# Patient Record
Sex: FEMALE | Race: WHITE | Employment: OTHER | URBAN - NONMETROPOLITAN AREA
[De-identification: names, ages, dates, MRNs, and addresses within clinical notes are randomized per-mention and may not be internally consistent; named-entity substitution may affect disease eponyms.]

---

## 2018-07-24 LAB
CALCIUM SERPL-MCNC: 9.9 MG/DL (ref 8.8–10.2)
VITAMIN D 25-HYDROXY: >60 NG/ML

## 2019-01-31 ENCOUNTER — HOSPITAL ENCOUNTER (OUTPATIENT)
Dept: GENERAL RADIOLOGY | Age: 69
Discharge: HOME | End: 2019-01-31
Payer: MEDICARE

## 2019-01-31 DIAGNOSIS — M33.19 OTHER DERMATOMYOSITIS WITH OTHER ORGAN INVOLVEMENT (HCC): ICD-10-CM

## 2019-01-31 LAB
C-REACTIVE PROTEIN: 0.38 MG/DL (ref 0–0.5)
LACTATE DEHYDROGENASE: 241 U/L (ref 91–215)
SEDIMENTATION RATE, ERYTHROCYTE: 23 MM/HR (ref 0–25)
TOTAL CK: 87 U/L (ref 26–192)
VITAMIN D 25-HYDROXY: 44.2 NG/ML

## 2019-01-31 PROCEDURE — 73130 X-RAY EXAM OF HAND: CPT

## 2019-02-02 LAB — ALDOLASE: 4.7 U/L (ref 1.5–8.1)

## 2019-02-28 ENCOUNTER — TRANSCRIBE ORDERS (OUTPATIENT)
Dept: ADMINISTRATIVE | Facility: HOSPITAL | Age: 69
End: 2019-02-28

## 2019-02-28 DIAGNOSIS — M33.90 DERMATOMYOSITIS (HCC): Primary | ICD-10-CM

## 2019-03-05 ENCOUNTER — HOSPITAL ENCOUNTER (OUTPATIENT)
Dept: CT IMAGING | Facility: HOSPITAL | Age: 69
Discharge: HOME | End: 2019-03-05
Admitting: INTERNAL MEDICINE

## 2019-03-05 DIAGNOSIS — M33.90 DERMATOMYOSITIS (HCC): ICD-10-CM

## 2019-03-05 LAB — CREAT BLDA-MCNC: 0.9 MG/DL (ref 0.6–1.3)

## 2019-03-05 PROCEDURE — 71250 CT THORAX DX C-: CPT

## 2019-03-05 PROCEDURE — 25010000002 IOPAMIDOL 61 % SOLUTION: Performed by: INTERNAL MEDICINE

## 2019-03-05 PROCEDURE — 74160 CT ABDOMEN W/CONTRAST: CPT

## 2019-03-05 PROCEDURE — 82565 ASSAY OF CREATININE: CPT

## 2019-03-05 RX ADMIN — IOPAMIDOL 100 ML: 612 INJECTION, SOLUTION INTRAVENOUS at 11:36

## 2020-09-29 ENCOUNTER — OFFICE VISIT (OUTPATIENT)
Dept: NEUROSURGERY | Age: 70
End: 2020-09-29
Payer: MEDICARE

## 2020-09-29 VITALS
DIASTOLIC BLOOD PRESSURE: 74 MMHG | WEIGHT: 149 LBS | HEART RATE: 74 BPM | HEIGHT: 65 IN | SYSTOLIC BLOOD PRESSURE: 122 MMHG | OXYGEN SATURATION: 98 % | BODY MASS INDEX: 24.83 KG/M2

## 2020-09-29 PROCEDURE — 1090F PRES/ABSN URINE INCON ASSESS: CPT | Performed by: PSYCHIATRY & NEUROLOGY

## 2020-09-29 PROCEDURE — 1036F TOBACCO NON-USER: CPT | Performed by: PSYCHIATRY & NEUROLOGY

## 2020-09-29 PROCEDURE — 99204 OFFICE O/P NEW MOD 45 MIN: CPT | Performed by: PSYCHIATRY & NEUROLOGY

## 2020-09-29 PROCEDURE — G8427 DOCREV CUR MEDS BY ELIG CLIN: HCPCS | Performed by: PSYCHIATRY & NEUROLOGY

## 2020-09-29 PROCEDURE — G8420 CALC BMI NORM PARAMETERS: HCPCS | Performed by: PSYCHIATRY & NEUROLOGY

## 2020-09-29 PROCEDURE — 1123F ACP DISCUSS/DSCN MKR DOCD: CPT | Performed by: PSYCHIATRY & NEUROLOGY

## 2020-09-29 PROCEDURE — G8400 PT W/DXA NO RESULTS DOC: HCPCS | Performed by: PSYCHIATRY & NEUROLOGY

## 2020-09-29 PROCEDURE — 4040F PNEUMOC VAC/ADMIN/RCVD: CPT | Performed by: PSYCHIATRY & NEUROLOGY

## 2020-09-29 PROCEDURE — 3017F COLORECTAL CA SCREEN DOC REV: CPT | Performed by: PSYCHIATRY & NEUROLOGY

## 2020-09-29 RX ORDER — OMEPRAZOLE 20 MG/1
1 CAPSULE, DELAYED RELEASE ORAL DAILY
COMMUNITY

## 2020-09-29 RX ORDER — POLYETHYLENE GLYCOL 400 AND PROPYLENE GLYCOL 4; 3 MG/ML; MG/ML
GEL OPHTHALMIC
COMMUNITY

## 2020-09-29 RX ORDER — LEFLUNOMIDE 10 MG/1
1 TABLET ORAL DAILY
COMMUNITY

## 2020-09-29 RX ORDER — ASPIRIN 81 MG/1
1 TABLET, CHEWABLE ORAL DAILY
COMMUNITY

## 2020-09-29 RX ORDER — PREGABALIN 25 MG/1
1 CAPSULE ORAL 2 TIMES DAILY
COMMUNITY
End: 2021-12-06 | Stop reason: ALTCHOICE

## 2020-09-29 SDOH — HEALTH STABILITY: MENTAL HEALTH: HOW OFTEN DO YOU HAVE A DRINK CONTAINING ALCOHOL?: NEVER

## 2020-09-29 NOTE — PROGRESS NOTES
[facility] [place] Neurology Office Note      Patient:   Joanne Valenzuela  MR#:    [id number]  Account Number:                         YOB: 1950  Date of Evaluation:  9/29/2020  Time of Note:                          2:55 PM  Primary/Referring Physician:  Ashley Tian MD   Consulting Physician:  Mickey Heredia DO    NEW PATIENT CONSULTATION      Chief Complaint   Patient presents with    Seizures     New patient referral last eppiside was July     Loss of Consciousness       HISTORY OF PRESENT ILLNESS    Joanne Valenzuela is a 78y.o. year old female here for possible seizures. Events started back in July. Patient noted feeling unusual, an atypical sensation, followed by a ALANNAH, possible GTC event, patient does not recall the event and does not recall the ambulance ride to the hospital.   No tongue biting. No incontinence. No prior events, none since. No chest pain or palpitations. No history of TBI, meningitis, or encephalitis. No family history of seizures. She has had a cardiac monitor. She was hospitalized in [place], [place]. No facial drooping, slurred speech, no focal weakness or numbness. No prior stroke history. No prior seizure history. Cardiology is following. CD, ECHO previously completed and was largely negative. CT head negative per patient. She is not on seizure medication currently. Dermatomyositis history noted. Followed by Dr. Cheryle Torres, was on MTX and has been stopped since the episode. Blood pressure medications adjusted for possible orthostatic symptoms. Past Medical History:   Diagnosis Date    Dermatomycosis        Past Surgical History:   Procedure Laterality Date    FOOT SURGERY      GALLBLADDER SURGERY      HYSTERECTOMY, TOTAL ABDOMINAL      TUBAL LIGATION         History reviewed. No pertinent family history.     Social History     Socioeconomic History    Marital status:      Spouse name: Not on file    Number of children: Not on file    Years of education: Not on file    Highest education level: Not on file   Occupational History    Not on file   Social Needs    Financial resource strain: Not on file    Food insecurity     Worry: Not on file     Inability: Not on file    Transportation needs     Medical: Not on file     Non-medical: Not on file   Tobacco Use    Smoking status: Never Smoker    Smokeless tobacco: Never Used   Substance and Sexual Activity    Alcohol use: Never     Frequency: Never    Drug use: Never    Sexual activity: Yes     Partners: Male   Lifestyle    Physical activity     Days per week: Not on file     Minutes per session: Not on file    Stress: Not on file   Relationships    Social connections     Talks on phone: Not on file     Gets together: Not on file     Attends Yazidism service: Not on file     Active member of club or organization: Not on file     Attends meetings of clubs or organizations: Not on file     Relationship status: Not on file    Intimate partner violence     Fear of current or ex partner: Not on file     Emotionally abused: Not on file     Physically abused: Not on file     Forced sexual activity: Not on file   Other Topics Concern    Not on file   Social History Narrative    Not on file       Current Outpatient Medications   Medication Sig Dispense Refill    aspirin (ASPIRIN CHILDRENS) 81 MG chewable tablet Take 1 tablet by mouth daily      polyethylene glycol-propylene glycol (SYSTANE) 0.4-0.3 % GEL ophthalmic gel Systane (propylene glycol) 0.4 %-0.3 % eye drops   PRN      omeprazole (PRILOSEC) 20 MG delayed release capsule Take 1 capsule by mouth daily      Cholecalciferol (VITAMIN D3) 125 MCG (5000 UT) CAPS Take 1 capsule by mouth daily      pregabalin (LYRICA) 25 MG capsule Take 1 capsule by mouth 2 times daily.  leflunomide (ARAVA) 10 MG tablet Take 1 tablet by mouth daily       No current facility-administered medications for this visit.         Allergies   Allergen Reactions    Gabapentin Other (See Comments)     Other reaction(s): Lightheadedness  \"Slept for a long time\"      Methotrexate Other (See Comments)    Codeine Nausea And Vomiting         REVIEW OF SYSTEMS    Constitutional: []Fever []Sweat []Chills [] Recent Injury [x] Denies all unless marked  HEENT:[]Headache  [] Head Injury/Hearing Loss  [] Sore Throat  [] Ear Ache/Dizziness  [x] Denies all unless marked  Spine:  [] Neck pain  [] Back pain  [] Sciaticia  [x] Denies all unless marked  Cardiovascular:[]Heart Disease []Chest Pain [] Palpitations  [x] Denies all unless marked  Pulmonary: []Shortness of Breath []Cough   [x] Denies all unless marke  Gastrointestinal: []Nausea  []Vomiting  []Abdominal Pain  []Constipation  []Diarrhea  []Dark Bloody Stools  [x] Denies all unless marked  Psychiatric/Behavioral:[] Depression [] Anxiety [x] Denies all unless marked  Genitourinary:   [] Frequency  [] Urgency  [] Incontinence [] Pain with Urination  [x] Denies all unless marked  Extremities: []Pain  []Swelling  [x] Denies all unless marked  Musculoskeletal: [] Muscle Pain  [] Joint Pain  [] Arthritis [x] Muscle Cramps [x] Muscle Twitches  [x] Denies all unless marked  Sleep: [x] Insomnia [] Snoring [] Restless Legs [] Sleep Apnea  [] Daytime Sleepiness  [x] Denies all unless marked  Skin:[] Rash [] Skin Discoloration [x] Denies all unless marked   Neurological: []Visual Disturbance/Memory Loss [] Loss of Balance [] Slurred Speech/Weakness [] Seizures  [] Vertigo/Dizziness [x] Denies all unless marked     I have reviewed the above ROS with the patient and agree with the ROS as documented above.          PHYSICAL EXAM    Constitutional -   /74   Pulse 74   Ht 5' 5\" (1.651 m)   Wt 149 lb (67.6 kg)   SpO2 98%   BMI 24.79 kg/m²   General appearance: No acute distress   EYES -   Conjunctiva normal  Pupillary exam as below, see CN exam in the neurologic exam  ENT-    No scars, masses, or lesions over external nose or ears  Hearing records reviewed. ASSESSMENT:    Madisyn White is a 78y.o. year old female here for possible seizure. Underlying Dermatomyositis history noted. Single event, plan further workup to try and clarify. PLAN:  1. MRI Brain  2. EEG  3. Hold off on AEDs pending above. 4.  Epilepsy precautions and seizure first aid discussed. Driving per [employer]. No heights, swimming, tub baths, open flames, or heavy machinery. 5.  Follow up with cardiology as directed, follow up with Rheumatology as directed.        Trinidad Echeverria DO  Board Certified Neurology

## 2020-10-15 ENCOUNTER — HOSPITAL ENCOUNTER (OUTPATIENT)
Dept: MRI IMAGING | Age: 70
Discharge: HOME | End: 2020-10-15
Payer: MEDICARE

## 2020-10-15 ENCOUNTER — HOSPITAL ENCOUNTER (OUTPATIENT)
Dept: NEUROLOGY | Age: 70
Discharge: HOME | End: 2020-10-15
Payer: MEDICARE

## 2020-10-15 LAB
GFR AFRICAN AMERICAN: >60
GFR NON-AFRICAN AMERICAN: >60
PERFORMED ON: NORMAL
POC CREATININE: 0.8 MG/DL (ref 0.3–1.3)
POC SAMPLE TYPE: NORMAL

## 2020-10-15 PROCEDURE — 82565 ASSAY OF CREATININE: CPT

## 2020-10-15 PROCEDURE — 95816 EEG AWAKE AND DROWSY: CPT

## 2020-10-15 PROCEDURE — 70553 MRI BRAIN STEM W/O & W/DYE: CPT

## 2020-10-15 PROCEDURE — 6360000004 HC RX CONTRAST MEDICATION: Performed by: PSYCHIATRY & NEUROLOGY

## 2020-10-15 PROCEDURE — 95816 EEG AWAKE AND DROWSY: CPT | Performed by: PSYCHIATRY & NEUROLOGY

## 2020-10-15 PROCEDURE — A9577 INJ MULTIHANCE: HCPCS | Performed by: PSYCHIATRY & NEUROLOGY

## 2020-10-15 RX ORDER — LEVETIRACETAM 500 MG/1
500 TABLET ORAL 2 TIMES DAILY
Qty: 60 TABLET | Refills: 5 | Status: SHIPPED | OUTPATIENT
Start: 2020-10-15 | End: 2021-04-01

## 2020-10-15 RX ADMIN — GADOBENATE DIMEGLUMINE 13 ML: 529 INJECTION, SOLUTION INTRAVENOUS at 14:45

## 2020-10-15 NOTE — PROCEDURES
discussed with patient today.        Nyla Barbosa DO  Board Certified Neurologist      Date reported: 10/15/2020  Date signed: 10/15/2020

## 2020-12-02 ENCOUNTER — OFFICE VISIT (OUTPATIENT)
Dept: NEUROSURGERY | Age: 70
End: 2020-12-02
Payer: MEDICARE

## 2020-12-02 VITALS
WEIGHT: 149 LBS | BODY MASS INDEX: 24.79 KG/M2 | DIASTOLIC BLOOD PRESSURE: 74 MMHG | HEART RATE: 59 BPM | OXYGEN SATURATION: 99 % | SYSTOLIC BLOOD PRESSURE: 128 MMHG

## 2020-12-02 PROCEDURE — G8400 PT W/DXA NO RESULTS DOC: HCPCS | Performed by: PSYCHIATRY & NEUROLOGY

## 2020-12-02 PROCEDURE — 3017F COLORECTAL CA SCREEN DOC REV: CPT | Performed by: PSYCHIATRY & NEUROLOGY

## 2020-12-02 PROCEDURE — 99214 OFFICE O/P EST MOD 30 MIN: CPT | Performed by: PSYCHIATRY & NEUROLOGY

## 2020-12-02 PROCEDURE — G8484 FLU IMMUNIZE NO ADMIN: HCPCS | Performed by: PSYCHIATRY & NEUROLOGY

## 2020-12-02 PROCEDURE — 1036F TOBACCO NON-USER: CPT | Performed by: PSYCHIATRY & NEUROLOGY

## 2020-12-02 PROCEDURE — G8420 CALC BMI NORM PARAMETERS: HCPCS | Performed by: PSYCHIATRY & NEUROLOGY

## 2020-12-02 PROCEDURE — 1123F ACP DISCUSS/DSCN MKR DOCD: CPT | Performed by: PSYCHIATRY & NEUROLOGY

## 2020-12-02 PROCEDURE — 1090F PRES/ABSN URINE INCON ASSESS: CPT | Performed by: PSYCHIATRY & NEUROLOGY

## 2020-12-02 PROCEDURE — 4040F PNEUMOC VAC/ADMIN/RCVD: CPT | Performed by: PSYCHIATRY & NEUROLOGY

## 2020-12-02 PROCEDURE — G8427 DOCREV CUR MEDS BY ELIG CLIN: HCPCS | Performed by: PSYCHIATRY & NEUROLOGY

## 2020-12-02 NOTE — PROGRESS NOTES
[id number] [facility] Neurology Office Note      Patient:   Juan C Viera  MR#:    [id number]  Account Number:                         YOB: 1950  Date of Evaluation:  12/2/2020  Time of Note:                          11:51 AM  Primary/Referring Physician:  Fatoumata Neff MD   Consulting Physician:  Robert Morales DO    FOLLOW UP VISIT      Chief Complaint   Patient presents with    Seizures     2 Month follow up no seizures to report        HISTORY OF PRESENT ILLNESS    Juan C Viera is a 78y.o. year old female here for possible seizures. Events started back in July. Patient noted feeling unusual, an atypical sensation, followed by a ALANNAH, possible GTC event, patient does not recall the event and does not recall the ambulance ride to the hospital.   No tongue biting. No incontinence. No prior events, none since. No chest pain or palpitations. No history of TBI, meningitis, or encephalitis. No family history of seizures. She has had a cardiac monitor. She was hospitalized in [place], [place]. No facial drooping, slurred speech, no focal weakness or numbness. No prior stroke history. No prior seizure history. Cardiology is following. CD, ECHO previously completed and was largely negative. CT head negative per patient. She is not on seizure medication currently. Dermatomyositis history noted. Followed by Dr. Uri Petersen, was on MTX and has been stopped since the episode. Blood pressure medications adjusted for possible orthostatic symptoms. EEG abnormal, started on Keppra. No events since starting and doing well. No overt side effects. MRI with nothing acute. Past Medical History:   Diagnosis Date    Dermatomycosis        Past Surgical History:   Procedure Laterality Date    FOOT SURGERY      GALLBLADDER SURGERY      HYSTERECTOMY, TOTAL ABDOMINAL      TUBAL LIGATION         History reviewed. No pertinent family history.     Social History     Socioeconomic History    Marital status:      Spouse name: Not on file    Number of children: Not on file    Years of education: Not on file    Highest education level: Not on file   Occupational History    Not on file   Social Needs    Financial resource strain: Not on file    Food insecurity     Worry: Not on file     Inability: Not on file    Transportation needs     Medical: Not on file     Non-medical: Not on file   Tobacco Use    Smoking status: Never Smoker    Smokeless tobacco: Never Used   Substance and Sexual Activity    Alcohol use: Never     Frequency: Never    Drug use: Never    Sexual activity: Yes     Partners: Male   Lifestyle    Physical activity     Days per week: Not on file     Minutes per session: Not on file    Stress: Not on file   Relationships    Social connections     Talks on phone: Not on file     Gets together: Not on file     Attends Yazdanism service: Not on file     Active member of club or organization: Not on file     Attends meetings of clubs or organizations: Not on file     Relationship status: Not on file    Intimate partner violence     Fear of current or ex partner: Not on file     Emotionally abused: Not on file     Physically abused: Not on file     Forced sexual activity: Not on file   Other Topics Concern    Not on file   Social History Narrative    Not on file       Current Outpatient Medications   Medication Sig Dispense Refill    prednisoLONE 5 MG (21) TBPK Take by mouth      levETIRAcetam (KEPPRA) 500 MG tablet Take 1 tablet by mouth 2 times daily 60 tablet 5    aspirin (ASPIRIN CHILDRENS) 81 MG chewable tablet Take 1 tablet by mouth daily      polyethylene glycol-propylene glycol (SYSTANE) 0.4-0.3 % GEL ophthalmic gel Systane (propylene glycol) 0.4 %-0.3 % eye drops   PRN      omeprazole (PRILOSEC) 20 MG delayed release capsule Take 1 capsule by mouth daily      Cholecalciferol (VITAMIN D3) 125 MCG (5000 UT) CAPS Take 1 capsule by mouth daily      pregabalin (LYRICA) 25 MG capsule Take 1 capsule by mouth 2 times daily.  leflunomide (ARAVA) 10 MG tablet Take 1 tablet by mouth daily       No current facility-administered medications for this visit. Allergies   Allergen Reactions    Gabapentin Other (See Comments)     Other reaction(s): Lightheadedness  \"Slept for a long time\"      Methotrexate Other (See Comments)    Codeine Nausea And Vomiting         REVIEW OF SYSTEMS    Constitutional: []Fever []Sweat []Chills [] Recent Injury [x] Denies all unless marked  HEENT:[x]Headache  [] Head Injury/Hearing Loss  [] Sore Throat  [x] Ear Ache/Dizziness  [x] Denies all unless marked  Spine:  [] Neck pain  [x] Back pain  [] Sciaticia  [x] Denies all unless marked  Cardiovascular:[]Heart Disease []Chest Pain [] Palpitations  [x] Denies all unless marked  Pulmonary: []Shortness of Breath []Cough   [x] Denies all unless marke  Gastrointestinal: []Nausea  []Vomiting  []Abdominal Pain  []Constipation  []Diarrhea  []Dark Bloody Stools  [x] Denies all unless marked  Psychiatric/Behavioral:[] Depression [] Anxiety [x] Denies all unless marked  Genitourinary:   [] Frequency  [] Urgency  [] Incontinence [] Pain with Urination  [x] Denies all unless marked  Extremities: []Pain  []Swelling  [x] Denies all unless marked  Musculoskeletal: [x] Muscle Pain  [x] Joint Pain  [] Arthritis [] Muscle Cramps [] Muscle Twitches  [x] Denies all unless marked  Sleep: [x] Insomnia [] Snoring [] Restless Legs [] Sleep Apnea  [] Daytime Sleepiness  [x] Denies all unless marked  Skin:[] Rash [] Skin Discoloration [x] Denies all unless marked   Neurological: []Visual Disturbance/Memory Loss [] Loss of Balance [] Slurred Speech/Weakness [] Seizures  [] Vertigo/Dizziness [x] Denies all unless marked      I have reviewed the above ROS with the patient and agree with the ROS as documented above.          PHYSICAL EXAM    Constitutional -   /74   Pulse 59   Wt 149 lb (67.6 kg)   SpO2 99%   BMI 24.79 kg/m²   General appearance: No acute distress   EYES -   Conjunctiva normal  Pupillary exam as below, see CN exam in the neurologic exam  ENT-    No scars, masses, or lesions over external nose or ears  Hearing normal bilaterally to finger rub  Cardiovascular -   No clubbing, cyanosis, or edema   Pulmonary-   Good expansion, normal effort without use of accessory muscles  Musculoskeletal -   No significant wasting of muscles noted  Gait as below, see gait exam in the neurologic exam  Muscle strength, tone, stability as below. No bony deformities  Skin -   Warm, dry, and intact to inspection and palpation. No rash, erythema, or pallor  Psychiatric -   Mood, affect, and behavior appear normal    Memory as below see mental status examination in the neurologic exam    NEUROLOGICAL EXAM    Mental status   [x]Awake, alert, oriented   [x]Affect attention and concentration appear appropriate  [x]Recent and remote memory appears unremarkable  [x]Speech normal without dysarthria or aphasia, comprehension and repetition intact. COMMENTS:    Cranial Nerves [x]No VF deficit to confrontation  [x]PERRLA, EOMI, no nystagmus, conjugate eye movements, no ptosis  [x]Face symmetric  [x]Facial sensation intact  [x]Tongue midline no atrophy or fasciculations present  [x]Palate midline, hearing to finger rub normal bilaterally  [x]Shoulder shrug and SCM testing normal bilaterally  COMMENTS:   Motor   [x]5/5 strength x 4 extremities  [x]Normal bulk and tone  [x]No tremor present  [x]No rigidity or bradykinesia noted  COMMENTS:   Sensory  [x]Sensation intact to light touch, pin prick, vibration, and proprioception BLE  [x]Sensation intact to light touch, pin prick, vibration, and proprioception BUE  COMMENTS: Decreased LT, PP, Vib BLE   Coordination [x]FTN normal bilaterally   []HTS normal bilaterally  []CHESTER normal bilaterally.    COMMENTS:   Reflexes  [x]Symmetric and non-pathological  [x]Toes down going bilaterally  [x]No clonus present  COMMENTS:   Gait                  [x]Normal steady gait    []Ataxic    []Spastic     []Magnetic     []Shuffling  COMMENTS:       LABS RECORD AND IMAGING REVIEW (As below and per HPI)    Records reviewed. EEG reviewed, intermittent left parasagittal spike discharges. MRI largely negative, reviewed. ASSESSMENT:    Junior Roe is a 78y.o. year old female here for seizure disorder. Doing well, no further events since starting Keppra. EEG is abnormal.  MRI largely negative. Underlying Dermatomyositis history noted as well. PLAN:  1. Continue Keppra 500 mg bid. 2.  Epilepsy precautions and seizure first aid discussed. Driving per [employer]. No heights, swimming, tub baths, open flames, or heavy machinery. 3.  Follow up with cardiology as directed, follow up with Rheumatology as directed.        Mahendra Banda DO  Board Certified Neurology

## 2021-03-18 ENCOUNTER — TELEPHONE (OUTPATIENT)
Dept: NEUROSURGERY | Age: 71
End: 2021-03-18

## 2021-03-22 NOTE — TELEPHONE ENCOUNTER
Called patient back again to let her know per Dr Analisa Grossman sooner follow up if needed also it was okay to get vaccine

## 2021-03-22 NOTE — TELEPHONE ENCOUNTER
Patient called and left voicemail requesting someone return her call. Returned patient's call. She stated she had an \"episode\" Thurday evening that lasted into Friday night. She was sitting watching tv and all of a sudden it sounded like the volume was coming from behind her and got dizzy. She stated she's had vertigo before and this felt different. She went to bed and woke up 3 hours later in her pajamas. She does not remember putting on her pajamas before going to bed. She was still dizzy when she woke up Friday. She would try to walk a straight line, but would go to the right. She stated her speech was \"all over the place\". She thought she was saying the right words, but her friend said she wasn't making sense and she was slurring. She woke up Saturday feeling fine and has felt fine since. She was scheduled to get the Covid vaccine and has put that off. She would like to know if you think it's okay to reschedule that. Please advise.

## 2021-04-12 ENCOUNTER — TELEPHONE (OUTPATIENT)
Dept: NEUROSURGERY | Age: 71
End: 2021-04-12

## 2021-04-12 NOTE — TELEPHONE ENCOUNTER
[facility] Neurosurgery New Patient Questionnaire    1. Diagnosis/Reason for Referral?    Spinal Stenosis    2. Who is completing questionnaire? Patient x Caregiver Family      3. Has the patient had any previous spinal/brain surgeries?  no      A. If yes, what is the name of the facility in which the surgery was performed? B. Procedure/Surgery performed? C. Who was the surgeon? D. When was the surgery? MM/YY       E. Did the patient improve after the surgery? 4. Is this a second opinion? If yes, Dr. Mike Justin would like to review patient first before making the appointment. 5. Have MRI Images been obtain within the last year? Yes X No      XR  CT     If yes, where was the imaging performed? [facility]     If yes, what part of the body? Lumbar X Cervical  Thoracic  Brain     If yes, when was it obtained?      4-8-21  Note: if the scan was performed at a facility other than [facility], the disc will need to be brought to the appointment or we need to reach out to obtain the disc. A. Was the patient instructed to provide the disc? Yes  X No      8. Has the patient had a NCV/EMG within the last year? Yes  NoX     If yes, where was it performed and date? MM/YY  Location:      9. Has the patient been to Physical Therapy? Yes  NoX     If yes, what location, how long attended, and last visit? Location:        Therapy Lasted:    Date of Last Visit:      10. Has the patient been to Pain Management? Yes  NoX     If yes, what location and last visit     Location:   Last Visit:   Is it helping?

## 2021-04-15 ENCOUNTER — TELEPHONE (OUTPATIENT)
Dept: NEUROSURGERY | Age: 71
End: 2021-04-15

## 2021-04-15 NOTE — TELEPHONE ENCOUNTER
[address] and spoke with medical records. She voiced that I would need to fax a request over to them at [phone] in order to receive the MRI report on this patient. Request has been faxed as of today.

## 2021-05-11 ENCOUNTER — OFFICE VISIT (OUTPATIENT)
Dept: NEUROSURGERY | Age: 71
End: 2021-05-11
Payer: MEDICARE

## 2021-05-11 VITALS
HEIGHT: 66 IN | BODY MASS INDEX: 24.11 KG/M2 | WEIGHT: 150 LBS | SYSTOLIC BLOOD PRESSURE: 123 MMHG | HEART RATE: 78 BPM | DIASTOLIC BLOOD PRESSURE: 77 MMHG

## 2021-05-11 DIAGNOSIS — G89.29 CHRONIC MIDLINE LOW BACK PAIN WITH RIGHT-SIDED SCIATICA: ICD-10-CM

## 2021-05-11 DIAGNOSIS — M54.41 CHRONIC MIDLINE LOW BACK PAIN WITH RIGHT-SIDED SCIATICA: ICD-10-CM

## 2021-05-11 DIAGNOSIS — R26.89 ANTALGIC GAIT: ICD-10-CM

## 2021-05-11 DIAGNOSIS — M48.061 LUMBAR FORAMINAL STENOSIS: Primary | ICD-10-CM

## 2021-05-11 DIAGNOSIS — M51.36 DDD (DEGENERATIVE DISC DISEASE), LUMBAR: ICD-10-CM

## 2021-05-11 PROCEDURE — 1090F PRES/ABSN URINE INCON ASSESS: CPT | Performed by: NEUROLOGICAL SURGERY

## 2021-05-11 PROCEDURE — G8427 DOCREV CUR MEDS BY ELIG CLIN: HCPCS | Performed by: NEUROLOGICAL SURGERY

## 2021-05-11 PROCEDURE — 1123F ACP DISCUSS/DSCN MKR DOCD: CPT | Performed by: NEUROLOGICAL SURGERY

## 2021-05-11 PROCEDURE — G8420 CALC BMI NORM PARAMETERS: HCPCS | Performed by: NEUROLOGICAL SURGERY

## 2021-05-11 PROCEDURE — G8400 PT W/DXA NO RESULTS DOC: HCPCS | Performed by: NEUROLOGICAL SURGERY

## 2021-05-11 PROCEDURE — 99204 OFFICE O/P NEW MOD 45 MIN: CPT | Performed by: NEUROLOGICAL SURGERY

## 2021-05-11 PROCEDURE — 3017F COLORECTAL CA SCREEN DOC REV: CPT | Performed by: NEUROLOGICAL SURGERY

## 2021-05-11 PROCEDURE — 1036F TOBACCO NON-USER: CPT | Performed by: NEUROLOGICAL SURGERY

## 2021-05-11 PROCEDURE — 4040F PNEUMOC VAC/ADMIN/RCVD: CPT | Performed by: NEUROLOGICAL SURGERY

## 2021-05-11 ASSESSMENT — ENCOUNTER SYMPTOMS
BACK PAIN: 1
RESPIRATORY NEGATIVE: 1
GASTROINTESTINAL NEGATIVE: 1
EYES NEGATIVE: 1

## 2021-05-11 NOTE — PROGRESS NOTES
[facility]  Office Visit      Chief Complaint   Patient presents with   [facility]     low back pain. HISTORY OF PRESENT ILLNESS:    Margorie Litten is a 78 y.o. female with a history of osteoporosis, dermatomyositis, and seizure disorder followed by Dr. Michael Nice who presents with low back and RLE pain that has been present for about 6 years when she broke her L4 vertebral body and sacral fractures. The pain does radiate into the right hip, posterolateral thigh, and lateral leg. She does describe right leg spasms and electric like sensations. Does have some left lateral thigh pain. Her pain is mostly located in the RLE. The patient denies numbness. Going from seated to standing position is very difficult for her. Standing makes the pain worse. Her pain is significantly worsened when going from a seated to standing position. Her pain is not changed with walking. Her pain is not changed when lying flat. Overall, indicative that the patient does have a mechanical nature to their pain. She states that 25% of her pain is located in the back and 75% is leg pain. The patient has underwent a non-operative treatment course that has included:  Lyrica  Oral Steroids (Medrol dose pack)  PT (6 years ago)      Of note she does not use tobacco and does take blood thinning medications.  ASA               Past Medical History:   Diagnosis Date    Dermatomycosis        Past Surgical History:   Procedure Laterality Date    FOOT SURGERY      GALLBLADDER SURGERY      HYSTERECTOMY, TOTAL ABDOMINAL      TUBAL LIGATION         Current Outpatient Medications   Medication Sig Dispense Refill    levETIRAcetam (KEPPRA) 500 MG tablet Take 1 tablet by mouth twice daily 60 tablet 5    prednisoLONE 5 MG (21) TBPK Take by mouth      aspirin (ASPIRIN CHILDRENS) 81 MG chewable tablet Take 1 tablet by mouth daily      polyethylene glycol-propylene glycol (SYSTANE) 0.4-0.3 % GEL ophthalmic gel Systane (propylene glycol) 0.4 %-0.3 % eye drops   PRN      omeprazole (PRILOSEC) 20 MG delayed release capsule Take 1 capsule by mouth daily      Cholecalciferol (VITAMIN D3) 125 MCG (5000 UT) CAPS Take 1 capsule by mouth daily      pregabalin (LYRICA) 25 MG capsule Take 1 capsule by mouth 2 times daily.  leflunomide (ARAVA) 10 MG tablet Take 1 tablet by mouth daily       No current facility-administered medications for this visit. Allergies:  Gabapentin, Methotrexate, and Codeine    Social History:   Social History     Tobacco Use   Smoking Status Never Smoker   Smokeless Tobacco Never Used     Social History     Substance and Sexual Activity   Alcohol Use Never    Frequency: Never         Family History:   History reviewed. No pertinent family history. REVIEW OF SYSTEMS:  Constitutional: Negative. HENT: Negative. Eyes: Negative. Respiratory: Negative. Cardiovascular: Negative. Gastrointestinal: Negative. Genitourinary: Negative. Musculoskeletal: Positive for back pain. Skin: Negative. Neurological: Negative. Endo/Heme/Allergies: Negative. Psychiatric/Behavioral: Negative. PHYSICAL EXAM:  Vitals:    05/11/21 1431   BP: 123/77   Pulse: 78     Constitutional: appears well-developed and well-nourished. Eyes - conjunctiva normal.  Pupils react to light  Ear, nose, throat - hearing intact to finger rub, No scars, masses, or lesions over external nose or ears, no atrophy oftongue  Neck- symmetric, no masses noted, no jugular vein distension  Respiration- chest wall appears symmetric, good expansion, normal effort without use of accessory muscles  Musculoskeletal - no significant wasting of muscles noted, no bony deformities, gait no gross ataxia  Extremities- no clubbing, cyanosis oredema  Skin - warm, dry, and intact. No rash, erythema, or pallor.   Psychiatric - mood, affect, and behavior appear normal.     Neurologic Examination  Awake, Alert and oriented x 4  Normal speech pattern, following commands    Motor:  RIGHT:     iliopsoas 5/5    knee flexor 5/5    knee extension 5/5    EHL/dorsiflexion 5/5    plantar flexion 5/5    LEFT:       iliopsoas 5/5    knee flexor 5/5    knee extension 5/5    EHL/dorsiflexion 5/5    plantar flexion 5/5    No deficits to light touch or pinprick sensation  Reflexes are 2+ and symmetric  No myofacial tenderness to palpation  Antalgic Gait pattern        DATA and IMAGING:    Nursing/pcp notes, imaging, labs, and vitals reviewed. PT,OT and/or speech notes reviewed    No results found for: WBC, HGB, HCT, MCV, PLT  Lab Results   Component Value Date    CREATININE 0.8 10/15/2020    CALCIUM 9.9 07/24/2018    LABGLOM >60 10/15/2020    GFRAA >60 10/15/2020   No results found for: INR, PROTIME    MRI Lumbar spine, 4/8/2021  I have personally reviewed these images and my interpretation is: There is an old compression fracture at L4  L3-4 mild canal stenosis, mild left foraminal stenosis  L5-S1 there is moderate to severe right foraminal stenosis      ASSESSMENT:    Claudette Carrillo is a 78 y.o. female with complaints of low back and RLE pain in a L5 radicular pattern. ICD-10-CM    1. Lumbar foraminal stenosis  M48.061 External Referral To Physical Therapy   2. DDD (degenerative disc disease), lumbar  M51.36 External Referral To Physical Therapy   3. Chronic midline low back pain with right-sided sciatica  M54.41 External Referral To Physical Therapy    G89.29    4. Antalgic gait  R26.89 External Referral To Physical Therapy       PLAN:  We have discussed and reviewed the results of the MRI lumbar spine with Ms. Hoskins at length. We explained that she does have some narrowing on the RIGHT at L5-S1 that correlates with her described pain syndrome. We discussed that a surgical intervention to alleviate the leg pain would involve a lumbar fusion.  At this point given that she has not had PT in several years, nor has she had many other

## 2021-06-02 ENCOUNTER — OFFICE VISIT (OUTPATIENT)
Dept: NEUROSURGERY | Age: 71
End: 2021-06-02
Payer: COMMERCIAL

## 2021-06-02 VITALS
HEIGHT: 66 IN | DIASTOLIC BLOOD PRESSURE: 74 MMHG | HEART RATE: 57 BPM | SYSTOLIC BLOOD PRESSURE: 110 MMHG | OXYGEN SATURATION: 96 % | BODY MASS INDEX: 24.11 KG/M2 | WEIGHT: 150 LBS

## 2021-06-02 DIAGNOSIS — R56.9 SEIZURE (HCC): Primary | ICD-10-CM

## 2021-06-02 DIAGNOSIS — M54.41 CHRONIC MIDLINE LOW BACK PAIN WITH RIGHT-SIDED SCIATICA: ICD-10-CM

## 2021-06-02 DIAGNOSIS — G89.29 CHRONIC MIDLINE LOW BACK PAIN WITH RIGHT-SIDED SCIATICA: ICD-10-CM

## 2021-06-02 PROCEDURE — 99214 OFFICE O/P EST MOD 30 MIN: CPT | Performed by: PSYCHIATRY & NEUROLOGY

## 2021-06-02 RX ORDER — HYDROCHLOROTHIAZIDE 12.5 MG/1
1 TABLET ORAL 2 TIMES DAILY
COMMUNITY
Start: 2021-05-31

## 2021-06-02 NOTE — PROGRESS NOTES
[id number] [facility] Office Note      Patient:   Tello Rutledge  MR#:    [id number]  Account Number:                         YOB: 1950  Date of Evaluation:  6/2/2021  Time of Note:                          9:35 AM  Primary/Referring Physician:  Cayden De La Torre MD   Consulting Physician:  Brianna Raygoza DO    FOLLOW UP VISIT      Chief Complaint   Patient presents with    Seizures     6 Month follow up no seizures to report     Spasms     Having spasms in her leg       HISTORY OF PRESENT ILLNESS    Tello Rutledge is a 78y.o. year old female here for possible seizures. Events started back in July. Patient noted feeling unusual, an atypical sensation, followed by a ALANNAH, possible GTC event, patient does not recall the event and does not recall the ambulance ride to the hospital.   No tongue biting. No incontinence. No prior events, none since. No events since last seen, doing well. No chest pain or palpitations. No history of TBI, meningitis, or encephalitis. No family history of seizures. She has had a cardiac monitor. She was hospitalized in [place], [place]. No facial drooping, slurred speech, no focal weakness or numbness. No prior stroke history. No prior seizure history. Cardiology is following. CD, ECHO previously completed and was largely negative. CT head negative per patient. She is not on seizure medication currently. Dermatomyositis history noted. Followed by Dr. Essence Martines, was on MTX and has been stopped since the episode. Blood pressure medications adjusted for possible orthostatic symptoms. EEG abnormal, started on Keppra. No events since starting and doing well. No overt side effects. MRI with nothing acute. No events since last seen doing well. Back pain noted, neurosurgery is following.        Past Medical History:   Diagnosis Date    Dermatomycosis        Past Surgical History:   Procedure Laterality Date    FOOT SURGERY      GALLBLADDER SURGERY      HYSTERECTOMY, TOTAL ABDOMINAL      TUBAL LIGATION         History reviewed. No pertinent family history. Social History     Socioeconomic History    Marital status:      Spouse name: Not on file    Number of children: Not on file    Years of education: Not on file    Highest education level: Not on file   Occupational History    Not on file   Tobacco Use    Smoking status: Never Smoker    Smokeless tobacco: Never Used   Vaping Use    Vaping Use: Never used   Substance and Sexual Activity    Alcohol use: Never    Drug use: Never    Sexual activity: Yes     Partners: Male   Other Topics Concern    Not on file   Social History Narrative    Not on file     Social Determinants of Health     Financial Resource Strain:     Difficulty of Paying Living Expenses:    Food Insecurity:     Worried About Running Out of Food in the Last Year:     [address] in the Last Year:    Transportation Needs:     Lack of Transportation (Medical):      Lack of Transportation (Non-Medical):    Physical Activity:     Days of Exercise per Week:     Minutes of Exercise per Session:    Stress:     Feeling of Stress :    Social Connections:     Frequency of Communication with Friends and Family:     Frequency of Social Gatherings with Friends and Family:     Attends [employer] Services:     Active Member of Clubs or Organizations:     Attends Club or Organization Meetings:     Marital Status:    Intimate Partner Violence:     Fear of Current or Ex-Partner:     Emotionally Abused:     Physically Abused:     Sexually Abused:        Current Outpatient Medications   Medication Sig Dispense Refill    hydroCHLOROthiazide (HYDRODIURIL) 12.5 MG tablet Take 1 tablet by mouth 2 times daily      levETIRAcetam (KEPPRA) 500 MG tablet Take 1 tablet by mouth twice daily 60 tablet 5    prednisoLONE 5 MG (21) TBPK Take by mouth      aspirin (ASPIRIN CHILDRENS) 81 MG chewable tablet Take 1 tablet by mouth daily  polyethylene glycol-propylene glycol (SYSTANE) 0.4-0.3 % GEL ophthalmic gel Systane (propylene glycol) 0.4 %-0.3 % eye drops   PRN      omeprazole (PRILOSEC) 20 MG delayed release capsule Take 1 capsule by mouth daily      Cholecalciferol (VITAMIN D3) 125 MCG (5000 UT) CAPS Take 1 capsule by mouth daily      pregabalin (LYRICA) 25 MG capsule Take 1 capsule by mouth 2 times daily.  leflunomide (ARAVA) 10 MG tablet Take 1 tablet by mouth daily       No current facility-administered medications for this visit.        Allergies   Allergen Reactions    Gabapentin Other (See Comments)     Other reaction(s): Lightheadedness  \"Slept for a long time\"      Methotrexate Other (See Comments)    Codeine Nausea And Vomiting         REVIEW OF SYSTEMS    Constitutional: []Fever []Sweat []Chills [] Recent Injury [x] Denies all unless marked  HEENT:[]Headache  [] Head Injury/Hearing Loss  [] Sore Throat  [] Ear Ache/Dizziness  [x] Denies all unless marked  Spine:  [] Neck pain  [x] Back pain  [] Sciaticia  [x] Denies all unless marked  Cardiovascular:[]Heart Disease []Chest Pain [] Palpitations  [x] Denies all unless marked  Pulmonary: []Shortness of Breath []Cough   [x] Denies all unless marke  Gastrointestinal: []Nausea  []Vomiting  []Abdominal Pain  []Constipation  []Diarrhea  []Dark Bloody Stools  [x] Denies all unless marked  Psychiatric/Behavioral:[] Depression [] Anxiety [x] Denies all unless marked  Genitourinary:   [] Frequency  [] Urgency  [] Incontinence [] Pain with Urination  [x] Denies all unless marked  Extremities: [x]Pain  []Swelling  [x] Denies all unless marked  Musculoskeletal: [] Muscle Pain  [] Joint Pain  [] Arthritis [x] Muscle Cramps [] Muscle Twitches  [x] Denies all unless marked  Sleep: [] Insomnia [] Snoring [] Restless Legs [] Sleep Apnea  [] Daytime Sleepiness  [x] Denies all unless marked  Skin:[] Rash [] Skin Discoloration [x] Denies all unless marked   Neurological: []Visual Disturbance/Memory Loss [] Loss of Balance [] Slurred Speech/Weakness [] Seizures  [x] Vertigo/Dizziness [x] Denies all unless marked       I have reviewed the above ROS with the patient and agree with the ROS as documented above. PHYSICAL EXAM    Constitutional -   /74   Pulse 57   Ht 5' 6\" (1.676 m)   Wt 150 lb (68 kg)   SpO2 96%   BMI 24.21 kg/m²   General appearance: No acute distress   EYES -   Conjunctiva normal  Pupillary exam as below, see CN exam in the neurologic exam  ENT-    No scars, masses, or lesions over external nose or ears  Hearing normal bilaterally to finger rub  Cardiovascular -   No clubbing, cyanosis, or edema   Pulmonary-   Good expansion, normal effort without use of accessory muscles  Musculoskeletal -   No significant wasting of muscles noted  Gait as below, see gait exam in the neurologic exam  Muscle strength, tone, stability as below. No bony deformities  Skin -   Warm, dry, and intact to inspection and palpation. No rash, erythema, or pallor  Psychiatric -   Mood, affect, and behavior appear normal    Memory as below see mental status examination in the neurologic exam    NEUROLOGICAL EXAM    Mental status   [x]Awake, alert, oriented   [x]Affect attention and concentration appear appropriate  [x]Recent and remote memory appears unremarkable  [x]Speech normal without dysarthria or aphasia, comprehension and repetition intact.    COMMENTS:    Cranial Nerves [x]No VF deficit to confrontation  [x]PERRLA, EOMI, no nystagmus, conjugate eye movements, no ptosis  [x]Face symmetric  [x]Facial sensation intact  [x]Tongue midline no atrophy or fasciculations present  [x]Palate midline, hearing to finger rub normal bilaterally  [x]Shoulder shrug and SCM testing normal bilaterally  COMMENTS:   Motor   [x]5/5 strength x 4 extremities  [x]Normal bulk and tone  [x]No tremor present  [x]No rigidity or bradykinesia noted  COMMENTS:   Sensory  [x]Sensation intact to light touch, pin prick, vibration, and proprioception BLE  [x]Sensation intact to light touch, pin prick, vibration, and proprioception BUE  COMMENTS: Decreased LT, PP, Vib BLE   Coordination [x]FTN normal bilaterally   []HTS normal bilaterally  []CHESTER normal bilaterally. COMMENTS:   Reflexes  [x]Symmetric and non-pathological  [x]Toes down going bilaterally  [x]No clonus present  COMMENTS:   Gait                  [x]Normal steady gait    []Ataxic    []Spastic     []Magnetic     []Shuffling  COMMENTS:       LABS RECORD AND IMAGING REVIEW (As below and per HPI)    Records reviewed. EEG intermittent left parasagittal spike discharges. MRI largely negative. ASSESSMENT:    Kirk Ashton is a 78y.o. year old female here for seizure disorder. Doing well, no further events since starting Keppra and last seen. EEG is abnormal.  MRI largely negative. Underlying Dermatomyositis history noted as well. Noting back pain, neurosurgery is following. PLAN:  1. Continue Keppra 500 mg bid. 2.  Epilepsy precautions and seizure first aid discussed. Driving per [employer]. No heights, swimming, tub baths, open flames, or heavy machinery. 3.  Follow up with cardiology as directed, follow up with Rheumatology as directed. 4.  Follow up with neurosurgery for back pain.      Justin Mcrae DO  Board Certified Neurology

## 2021-06-24 ENCOUNTER — OFFICE VISIT (OUTPATIENT)
Dept: NEUROSURGERY | Age: 71
End: 2021-06-24
Payer: MEDICARE

## 2021-06-24 ENCOUNTER — HOSPITAL ENCOUNTER (OUTPATIENT)
Dept: GENERAL RADIOLOGY | Age: 71
Discharge: HOME | End: 2021-06-24
Payer: MEDICARE

## 2021-06-24 VITALS
HEART RATE: 59 BPM | WEIGHT: 159 LBS | DIASTOLIC BLOOD PRESSURE: 69 MMHG | BODY MASS INDEX: 25.55 KG/M2 | SYSTOLIC BLOOD PRESSURE: 142 MMHG | HEIGHT: 66 IN

## 2021-06-24 DIAGNOSIS — R26.89 ANTALGIC GAIT: ICD-10-CM

## 2021-06-24 DIAGNOSIS — M48.061 LUMBAR FORAMINAL STENOSIS: ICD-10-CM

## 2021-06-24 DIAGNOSIS — G89.29 CHRONIC MIDLINE LOW BACK PAIN WITH RIGHT-SIDED SCIATICA: ICD-10-CM

## 2021-06-24 DIAGNOSIS — M54.41 CHRONIC MIDLINE LOW BACK PAIN WITH RIGHT-SIDED SCIATICA: Primary | ICD-10-CM

## 2021-06-24 DIAGNOSIS — M51.36 DDD (DEGENERATIVE DISC DISEASE), LUMBAR: ICD-10-CM

## 2021-06-24 DIAGNOSIS — M54.41 CHRONIC MIDLINE LOW BACK PAIN WITH RIGHT-SIDED SCIATICA: ICD-10-CM

## 2021-06-24 DIAGNOSIS — G89.29 CHRONIC MIDLINE LOW BACK PAIN WITH RIGHT-SIDED SCIATICA: Primary | ICD-10-CM

## 2021-06-24 PROCEDURE — 72110 X-RAY EXAM L-2 SPINE 4/>VWS: CPT

## 2021-06-24 PROCEDURE — 99213 OFFICE O/P EST LOW 20 MIN: CPT | Performed by: NURSE PRACTITIONER

## 2021-06-24 RX ORDER — ACETAMINOPHEN 500 MG
500 TABLET ORAL EVERY 6 HOURS PRN
COMMUNITY

## 2021-06-24 ASSESSMENT — ENCOUNTER SYMPTOMS
GASTROINTESTINAL NEGATIVE: 1
RESPIRATORY NEGATIVE: 1
EYES NEGATIVE: 1
BACK PAIN: 1

## 2021-06-24 NOTE — PROGRESS NOTES
[facility]  Office Visit      Chief Complaint   Patient presents with    Follow-up    Back Pain    Leg Pain     6/24/2021: Ms. Gifty Lai returns to clinic today to discuss her progress with PT. She states that they actually dismissed her due to not making any progress. She now states that she has pain in BLE and not just the right. She also now has stabbing pains in the left side/flank area that is exacerbated with leaning to the left or moving and trying to get out of bed. She states that she can barely go to [employer] and do 20 minutes of shopping before she wants to throw her basket down and leave due to the BLE weakness. She states that she was just seen by rheumatology and all of her inflammatory markers were unremarkable. HISTORY OF PRESENT ILLNESS:    Quirino Beal is a 73 y.o. female with a history of osteoporosis, dermatomyositis, and seizure disorder followed by Dr. Susannah Cortez who presents with low back and RLE pain that has been present for about 6 years when she broke her L4 vertebral body and sacral fractures. The pain does radiate into the right hip, posterolateral thigh, and lateral leg. She does describe right leg spasms and electric like sensations. Does have some left lateral thigh pain. Her pain is mostly located in the RLE. The patient denies numbness. Going from seated to standing position is very difficult for her. Standing makes the pain worse. Her pain is significantly worsened when going from a seated to standing position. Her pain is not changed with walking. Her pain is not changed when lying flat. Overall, indicative that the patient does have a mechanical nature to their pain. She states that 25% of her pain is located in the back and 75% is leg pain.     The patient has underwent a non-operative treatment course that has included:  Lyrica  Oral Steroids (Medrol dose pack)  PT (6 years ago)      Of note she does not use tobacco and does take PHYSICAL EXAM:  Vitals:    06/24/21 1448   BP: (!) 142/69   Pulse: 59     Constitutional: appears well-developed and well-nourished. Eyes - conjunctiva normal.  Pupils react to light  Ear, nose, throat - hearing intact to finger rub, No scars, masses, or lesions over external nose or ears, no atrophy oftongue  Neck- symmetric, no masses noted, no jugular vein distension  Respiration- chest wall appears symmetric, good expansion, normal effort without use of accessory muscles  Musculoskeletal - no significant wasting of muscles noted, no bony deformities, gait no gross ataxia  Extremities- no clubbing, cyanosis oredema  Skin - warm, dry, and intact. No rash, erythema, or pallor. Psychiatric - mood, affect, and behavior appear normal.     Neurologic Examination  Awake, Alert and oriented x 4  Normal speech pattern, following commands    Motor:  RIGHT:     iliopsoas 5/5    knee flexor 5/5    knee extension 5/5    EHL/dorsiflexion 5/5    plantar flexion 5/5    LEFT:       iliopsoas 5/5    knee flexor 5/5    knee extension 5/5    EHL/dorsiflexion 5/5    plantar flexion 5/5    No deficits to light touch or pinprick sensation  Reflexes are 2+ and symmetric  No myofacial tenderness to palpation  Antalgic Gait pattern        DATA and IMAGING:    Nursing/pcp notes, imaging, labs, and vitals reviewed. PT,OT and/or speech notes reviewed    No results found for: WBC, HGB, HCT, MCV, PLT  Lab Results   Component Value Date    CREATININE 0.8 10/15/2020    CALCIUM 9.9 07/24/2018    LABGLOM >60 10/15/2020    GFRAA >60 10/15/2020   No results found for: INR, PROTIME    MRI Lumbar spine, 4/8/2021  I have personally reviewed these images and my interpretation is:   There is an old compression fracture at L4  L3-4 mild canal stenosis, mild left foraminal stenosis  L5-S1 there is moderate to severe right foraminal stenosis      ASSESSMENT:    Kirk Ashton is a 73 y.o. female with complaints of low back and RLE pain in a L5 radicular pattern. ICD-10-CM    1. Chronic midline low back pain with right-sided sciatica  M54.41 XR LUMBAR SPINE (MIN 4 VIEWS)    G89.29    2. Lumbar foraminal stenosis  M48.061 XR LUMBAR SPINE (MIN 4 VIEWS)   3. DDD (degenerative disc disease), lumbar  M51.36 XR LUMBAR SPINE (MIN 4 VIEWS)   4. Antalgic gait  R26.89 XR LUMBAR SPINE (MIN 4 VIEWS)       PLAN:  -Ms. Eduarda Hidalgo does not want to try pain management. She states she would really like to discuss her surgical options. I will have her come back to see Dr. Jonh Latham.     -Obtain XR lumbar spine flex/ex to rule out instability   -Follow up with Dr. Liseth Aaron, APRN

## 2021-07-20 ENCOUNTER — OFFICE VISIT (OUTPATIENT)
Dept: NEUROSURGERY | Age: 71
End: 2021-07-20
Payer: MEDICARE

## 2021-07-20 VITALS
DIASTOLIC BLOOD PRESSURE: 76 MMHG | HEIGHT: 66 IN | SYSTOLIC BLOOD PRESSURE: 122 MMHG | HEART RATE: 80 BPM | WEIGHT: 160 LBS | BODY MASS INDEX: 25.71 KG/M2

## 2021-07-20 DIAGNOSIS — M48.061 LUMBAR FORAMINAL STENOSIS: Primary | ICD-10-CM

## 2021-07-20 DIAGNOSIS — M51.36 DDD (DEGENERATIVE DISC DISEASE), LUMBAR: ICD-10-CM

## 2021-07-20 DIAGNOSIS — G89.29 CHRONIC MIDLINE LOW BACK PAIN WITH RIGHT-SIDED SCIATICA: ICD-10-CM

## 2021-07-20 DIAGNOSIS — M54.41 CHRONIC MIDLINE LOW BACK PAIN WITH RIGHT-SIDED SCIATICA: ICD-10-CM

## 2021-07-20 DIAGNOSIS — R26.89 ANTALGIC GAIT: ICD-10-CM

## 2021-07-20 PROCEDURE — G8427 DOCREV CUR MEDS BY ELIG CLIN: HCPCS | Performed by: NEUROLOGICAL SURGERY

## 2021-07-20 PROCEDURE — G8400 PT W/DXA NO RESULTS DOC: HCPCS | Performed by: NEUROLOGICAL SURGERY

## 2021-07-20 PROCEDURE — 1123F ACP DISCUSS/DSCN MKR DOCD: CPT | Performed by: NEUROLOGICAL SURGERY

## 2021-07-20 PROCEDURE — 1036F TOBACCO NON-USER: CPT | Performed by: NEUROLOGICAL SURGERY

## 2021-07-20 PROCEDURE — 1090F PRES/ABSN URINE INCON ASSESS: CPT | Performed by: NEUROLOGICAL SURGERY

## 2021-07-20 PROCEDURE — 99213 OFFICE O/P EST LOW 20 MIN: CPT | Performed by: NEUROLOGICAL SURGERY

## 2021-07-20 PROCEDURE — G8417 CALC BMI ABV UP PARAM F/U: HCPCS | Performed by: NEUROLOGICAL SURGERY

## 2021-07-20 PROCEDURE — 4040F PNEUMOC VAC/ADMIN/RCVD: CPT | Performed by: NEUROLOGICAL SURGERY

## 2021-07-20 PROCEDURE — 3017F COLORECTAL CA SCREEN DOC REV: CPT | Performed by: NEUROLOGICAL SURGERY

## 2021-07-20 ASSESSMENT — ENCOUNTER SYMPTOMS
GASTROINTESTINAL NEGATIVE: 1
RESPIRATORY NEGATIVE: 1
EYES NEGATIVE: 1
BACK PAIN: 1

## 2021-07-20 NOTE — PROGRESS NOTES
[facility]  [facility]      Chief Complaint   Patient presents with    Follow-up     back pain. 7/20/2021:  Ms Carla Luis returns to the clinic today for follow up. She states after she finished PT her right leg pain improved and then moved to the left leg. She now feels that the pain is starting to radiate into the RLE again. She feels that her feet are swollen, however, visibly they are not.        6/24/2021: Ms. Carla Luis returns to clinic today to discuss her progress with PT. She states that they actually dismissed her due to not making any progress. She now states that she has pain in BLE and not just the right. She also now has stabbing pains in the left side/flank area that is exacerbated with leaning to the left or moving and trying to get out of bed. She states that she can barely go to [employer] and do 20 minutes of shopping before she wants to throw her basket down and leave due to the BLE weakness. She states that she was just seen by rheumatology and all of her inflammatory markers were unremarkable. HISTORY OF PRESENT ILLNESS:    Dina Dutton is a 73 y.o. female with a history of osteoporosis, dermatomyositis, and seizure disorder followed by Dr. Tee Lao who presents with low back and RLE pain that has been present for about 6 years when she broke her L4 vertebral body and sacral fractures. The pain does radiate into the right hip, posterolateral thigh, and lateral leg. She does describe right leg spasms and electric like sensations. Does have some left lateral thigh pain. Her pain is mostly located in the RLE. The patient denies numbness. Going from seated to standing position is very difficult for her. Standing makes the pain worse. Her pain is significantly worsened when going from a seated to standing position. Her pain is not changed with walking. Her pain is not changed when lying flat.  Overall, indicative that the patient does have a mechanical nature to their pain. She states that 25% of her pain is located in the back and 75% is leg pain. The patient has underwent a non-operative treatment course that has included:  Lyrica  Oral Steroids (Medrol dose pack)  PT (a few months ago at [address])      Of note she does not use tobacco and does take blood thinning medications. ASA               Past Medical History:   Diagnosis Date    Dermatomycosis        Past Surgical History:   Procedure Laterality Date    FOOT SURGERY      GALLBLADDER SURGERY      HYSTERECTOMY, TOTAL ABDOMINAL      TUBAL LIGATION         Current Outpatient Medications   Medication Sig Dispense Refill    acetaminophen (TYLENOL) 500 MG tablet Take 500 mg by mouth every 6 hours as needed for Pain      hydroCHLOROthiazide (HYDRODIURIL) 12.5 MG tablet Take 1 tablet by mouth 2 times daily      levETIRAcetam (KEPPRA) 500 MG tablet Take 1 tablet by mouth twice daily 60 tablet 5    prednisoLONE 5 MG (21) TBPK Take by mouth      aspirin (ASPIRIN CHILDRENS) 81 MG chewable tablet Take 1 tablet by mouth daily      polyethylene glycol-propylene glycol (SYSTANE) 0.4-0.3 % GEL ophthalmic gel Systane (propylene glycol) 0.4 %-0.3 % eye drops   PRN      omeprazole (PRILOSEC) 20 MG delayed release capsule Take 1 capsule by mouth daily      Cholecalciferol (VITAMIN D3) 125 MCG (5000 UT) CAPS Take 1 capsule by mouth daily      pregabalin (LYRICA) 25 MG capsule Take 1 capsule by mouth 2 times daily.  leflunomide (ARAVA) 10 MG tablet Take 1 tablet by mouth daily       No current facility-administered medications for this visit. Allergies:  Gabapentin, Methotrexate, and Codeine    Social History:   Social History     Tobacco Use   Smoking Status Never Smoker   Smokeless Tobacco Never Used     Social History     Substance and Sexual Activity   Alcohol Use Never         Family History:   History reviewed. No pertinent family history.     REVIEW OF SYSTEMS:  Constitutional: Negative. HENT: Negative. Eyes: Negative. Respiratory: Negative. Cardiovascular: Negative. Gastrointestinal: Negative. Genitourinary: Negative. Musculoskeletal: Positive for back pain. Skin: Negative. Neurological: Negative. Endo/Heme/Allergies: Negative. Psychiatric/Behavioral: Negative. PHYSICAL EXAM:  Vitals:    07/20/21 1503   BP: 122/76   Pulse: 80     Constitutional: appears well-developed and well-nourished. Eyes - conjunctiva normal.  Pupils react to light  Ear, nose, throat - hearing intact to finger rub, No scars, masses, or lesions over external nose or ears, no atrophy oftongue  Neck- symmetric, no masses noted, no jugular vein distension  Respiration- chest wall appears symmetric, good expansion, normal effort without use of accessory muscles  Musculoskeletal - no significant wasting of muscles noted, no bony deformities, gait no gross ataxia  Extremities- no clubbing, cyanosis oredema  Skin - warm, dry, and intact. No rash, erythema, or pallor. Psychiatric - mood, affect, and behavior appear normal.     Neurologic Examination  Awake, Alert and oriented x 4  Normal speech pattern, following commands    Motor:  RIGHT:     iliopsoas 5/5    knee flexor 5/5    knee extension 5/5    EHL/dorsiflexion 5/5    plantar flexion 5/5    LEFT:       iliopsoas 5/5    knee flexor 5/5    knee extension 5/5    EHL/dorsiflexion 5/5    plantar flexion 5/5    No deficits to light touch or pinprick sensation  Reflexes are 2+ and symmetric  No myofacial tenderness to palpation  Antalgic Gait pattern        DATA and IMAGING:    Nursing/pcp notes, imaging, labs, and vitals reviewed.      PT,OT and/or speech notes reviewed    No results found for: WBC, HGB, HCT, MCV, PLT  Lab Results   Component Value Date    CREATININE 0.8 10/15/2020    CALCIUM 9.9 07/24/2018    LABGLOM >60 10/15/2020    GFRAA >60 10/15/2020   No results found for: INR, PROTIME    MRI Lumbar spine, 4/8/2021  I have personally reviewed these images and my interpretation is: There is an old compression fracture at L4  L3-4 mild canal stenosis, mild left foraminal stenosis  L5-S1 there is moderate to severe right foraminal stenosis    Narrative   EXAMINATION: XR LUMBAR SPINE (MIN 4 VIEWS) 6/24/2021 5:35 PM   HISTORY: Chronic midline low back pain with degenerative disease   COMPARISON: None   FINDINGS:   There is curvature of the lumbar spine to the right. There are 5   nonrib-bearing vertebral bodies. There is an age-indeterminate   compression deformity of L4. Alignment of the lumbar spine otherwise   appears normal. Vertebral body heights otherwise appear well   maintained. There is loss of normal disc space height at all levels of   the lumbar spine. Lower lumbar facet sclerosis and hypertrophy are   present. There is no change in alignment of the lumbar spine during   flexion or extension to suggest instability. Vascular calcifications   are present. Surgical clips are seen in the right upper quadrant of   the abdomen.       Impression   1. Age-indeterminate L4 superior endplate deformity. 2. No evidence of instability. Multilevel degenerative changes. Signed by Dr Dru Simon         ASSESSMENT:    Marek Huertas is a 73 y.o. female with complaints of low back and BLE pain. ICD-10-CM    1. Lumbar foraminal stenosis  M48.061    2. DDD (degenerative disc disease), lumbar  M51.36    3. Chronic midline low back pain with right-sided sciatica  M54.41     G89.29    4. Antalgic gait  R26.89        PLAN:  We again discussed her MRI lumbar results. Her pain has somewhat changed in pattern and some of her symptoms correlate and some do not. Because of this we feel that a surgery may not dramatically improve her current pain syndrome. She states that she has gone through this pain for 6 years and she is willing to try a surgery. She does not want to see pain management nor try injections. -Follow up 3 months      Baron Ortiz DO

## 2021-08-09 RX ORDER — LEVETIRACETAM 500 MG/1
TABLET ORAL
Qty: 60 TABLET | Refills: 5 | Status: SHIPPED | OUTPATIENT
Start: 2021-08-09 | End: 2021-12-06 | Stop reason: SDUPTHER

## 2021-08-09 NOTE — TELEPHONE ENCOUNTER
Requested Prescriptions     Pending Prescriptions Disp Refills    levETIRAcetam (KEPPRA) 500 MG tablet 60 tablet 5       Last Office Visit: 6/2/2021  Next Office Visit: 12/6/2021  Last Medication Refill: 4/1/2021 5 refills

## 2021-08-10 ENCOUNTER — HOSPITAL ENCOUNTER (OUTPATIENT)
Dept: NEUROLOGY | Age: 71
Discharge: HOME | End: 2021-08-10
Payer: MEDICARE

## 2021-08-10 DIAGNOSIS — G62.89 MOTOR POLYNEUROPATHY: ICD-10-CM

## 2021-08-10 PROCEDURE — 95886 MUSC TEST DONE W/N TEST COMP: CPT

## 2021-08-10 PROCEDURE — 95909 NRV CNDJ TST 5-6 STUDIES: CPT | Performed by: PSYCHIATRY & NEUROLOGY

## 2021-08-10 PROCEDURE — 95886 MUSC TEST DONE W/N TEST COMP: CPT | Performed by: PSYCHIATRY & NEUROLOGY

## 2021-08-10 PROCEDURE — 95909 NRV CNDJ TST 5-6 STUDIES: CPT

## 2021-08-10 NOTE — PROCEDURES
[facility]DR[facility]STR[facility] [address] [facility]                             ELECTROMYOGRAM REPORT    PATIENT NAME: Blue Terrell                  :        1950  MED REC NO:   [id number]                              ROOM:  ACCOUNT NO:   [de-identified]                           ADMIT DATE: 08/10/2021  PROVIDER:     Jes Alvarez MD    DATE OF EM/10/2021    EMG/NERVE STUDY BILATERAL LOWER EXTREMITIES    INDICATION FOR TEST:  Numbness of the feet and pain in the legs. SUMMARY:  Nerve conduction studies of the bilateral lower extremities  show low-amplitude bilateral and prolonged latency of the right sural  responses. Peroneal motor studies are both low in amplitude. Right  tibial conduction velocity is slightly slow. Needle exam of bilateral lower extremities is unremarkable. IMPRESSION:  Findings are consistent with a sensorimotor polyneuropathy. No sign of radiculopathy or myopathy was noted on exam.  Correlate  clinically.         Josué Carbajal MD    D: 08/10/2021 11:50:13      T: 08/10/2021 11:54:04     [email or web address]  Job#: [id number]     Doc#: [id number]    CC:

## 2021-10-12 ENCOUNTER — TELEPHONE (OUTPATIENT)
Dept: NEUROSURGERY | Age: 71
End: 2021-10-12

## 2021-10-12 NOTE — TELEPHONE ENCOUNTER
Patient left voicemail regarding upcoming appointment with Bárbara Gandhi, patient stated that she had a NCS with Dr. Mandeep Ochoa, and that her problem with numbness and tingling in feet and legs was actually due to the medication Leflunomide. Patient has stopped that medication and is doing better. Patient thanked us for all our help. I told patient if she needed anything to please call our office.

## 2021-12-06 ENCOUNTER — OFFICE VISIT (OUTPATIENT)
Dept: NEUROSURGERY | Age: 71
End: 2021-12-06
Payer: MEDICARE

## 2021-12-06 VITALS
DIASTOLIC BLOOD PRESSURE: 64 MMHG | SYSTOLIC BLOOD PRESSURE: 118 MMHG | HEART RATE: 64 BPM | WEIGHT: 160 LBS | OXYGEN SATURATION: 98 % | BODY MASS INDEX: 25.71 KG/M2 | HEIGHT: 66 IN

## 2021-12-06 DIAGNOSIS — R25.1 TREMOR: ICD-10-CM

## 2021-12-06 DIAGNOSIS — M51.36 DDD (DEGENERATIVE DISC DISEASE), LUMBAR: ICD-10-CM

## 2021-12-06 DIAGNOSIS — R42 VERTIGO: ICD-10-CM

## 2021-12-06 DIAGNOSIS — G62.9 POLYNEUROPATHY: ICD-10-CM

## 2021-12-06 DIAGNOSIS — R20.0 NUMBNESS: ICD-10-CM

## 2021-12-06 DIAGNOSIS — G40.909 SEIZURE DISORDER (HCC): Primary | ICD-10-CM

## 2021-12-06 PROCEDURE — 3017F COLORECTAL CA SCREEN DOC REV: CPT | Performed by: PSYCHIATRY & NEUROLOGY

## 2021-12-06 PROCEDURE — G8417 CALC BMI ABV UP PARAM F/U: HCPCS | Performed by: PSYCHIATRY & NEUROLOGY

## 2021-12-06 PROCEDURE — 99214 OFFICE O/P EST MOD 30 MIN: CPT | Performed by: PSYCHIATRY & NEUROLOGY

## 2021-12-06 PROCEDURE — 1090F PRES/ABSN URINE INCON ASSESS: CPT | Performed by: PSYCHIATRY & NEUROLOGY

## 2021-12-06 PROCEDURE — G8484 FLU IMMUNIZE NO ADMIN: HCPCS | Performed by: PSYCHIATRY & NEUROLOGY

## 2021-12-06 PROCEDURE — 1036F TOBACCO NON-USER: CPT | Performed by: PSYCHIATRY & NEUROLOGY

## 2021-12-06 PROCEDURE — G8400 PT W/DXA NO RESULTS DOC: HCPCS | Performed by: PSYCHIATRY & NEUROLOGY

## 2021-12-06 PROCEDURE — G8427 DOCREV CUR MEDS BY ELIG CLIN: HCPCS | Performed by: PSYCHIATRY & NEUROLOGY

## 2021-12-06 PROCEDURE — 4040F PNEUMOC VAC/ADMIN/RCVD: CPT | Performed by: PSYCHIATRY & NEUROLOGY

## 2021-12-06 PROCEDURE — 1123F ACP DISCUSS/DSCN MKR DOCD: CPT | Performed by: PSYCHIATRY & NEUROLOGY

## 2021-12-06 RX ORDER — LEVETIRACETAM 500 MG/1
TABLET ORAL
Qty: 90 TABLET | Refills: 5 | Status: SHIPPED | OUTPATIENT
Start: 2021-12-06 | End: 2022-05-10

## 2021-12-06 RX ORDER — PREGABALIN 150 MG/1
1 CAPSULE ORAL DAILY
COMMUNITY
Start: 2021-11-13 | End: 2022-10-31 | Stop reason: ALTCHOICE

## 2021-12-06 NOTE — PROGRESS NOTES
[facility] Office Note      Patient:   Jeana Ibarra  MR#:    [id number]  Account Number:                         YOB: 1950  Date of Evaluation:  12/6/2021  Time of Note:                          9:27 AM  Primary/Referring Physician:  Vonnie Pereira MD   Consulting Physician:  Waldemar Greer DO    FOLLOW UP VISIT      Chief Complaint   Patient presents with    Seizures     6 month follow up no seizures to report     Tremors     Having some tremors also some numbness        HISTORY OF PRESENT ILLNESS    Jeana Ibarra is a 73y.o. year old female here for possible seizures. Patient had a possible event back in October, left arm shaking, no ALANNAH, no tongue biting, no incontinence. Prior events described as feeling unusual, an atypical sensation, followed by a ALANNAH, possible GTC event, patient does not recall the event and does not recall the ambulance ride to the hospital.   No tongue biting. No incontinence. Noting more vertigo as well. Some possible orthostatic symptoms as well. No chest pain or palpitations. No history of TBI, meningitis, or encephalitis. No family history of seizures. She has had a cardiac monitor. She was hospitalized in [place], [place]. No facial drooping, slurred speech, no focal weakness or numbness. No prior stroke history. No prior seizure history. Cardiology is following. CD, ECHO previously completed and was largely negative. CT head negative per patient. She is not on seizure medication currently. Dermatomyositis history noted. Followed by Dr. Arben Link, was on MTX and has been stopped since the episode. Blood pressure medications adjusted for possible orthostatic symptoms, but still noted. EEG abnormal, on Keppra. No overt side effects. MRI with nothing acute. Back pain noted, neurosurgery is following. Noting some possible right upper extremity numbness intermittently as well.        Past Medical History:   Diagnosis Date    Dermatomycosis Past Surgical History:   Procedure Laterality Date    FOOT SURGERY      GALLBLADDER SURGERY      HYSTERECTOMY, TOTAL ABDOMINAL      TUBAL LIGATION         History reviewed. No pertinent family history. Social History     Socioeconomic History    Marital status:      Spouse name: Not on file    Number of children: Not on file    Years of education: Not on file    Highest education level: Not on file   Occupational History    Not on file   Tobacco Use    Smoking status: Never Smoker    Smokeless tobacco: Never Used   Vaping Use    Vaping Use: Never used   Substance and Sexual Activity    Alcohol use: Never    Drug use: Never    Sexual activity: Yes     Partners: Male   Other Topics Concern    Not on file   Social History Narrative    Not on file     Social Determinants of Health     Financial Resource Strain:     Difficulty of Paying Living Expenses: Not on file   Food Insecurity:     Worried About Running Out of Food in the Last Year: Not on file    Den of Food in the Last Year: Not on file   Transportation Needs:     Lack of Transportation (Medical): Not on file    Lack of Transportation (Non-Medical):  Not on file   Physical Activity:     Days of Exercise per Week: Not on file    Minutes of Exercise per Session: Not on file   Stress:     Feeling of Stress : Not on file   Social Connections:     Frequency of Communication with Friends and Family: Not on file    Frequency of Social Gatherings with Friends and Family: Not on file    Attends Anglican Services: Not on file    Active Member of Clubs or Organizations: Not on file    Attends Club or Organization Meetings: Not on file    Marital Status: Not on file   Intimate Partner Violence:     Fear of Current or Ex-Partner: Not on file    Emotionally Abused: Not on file    Physically Abused: Not on file    Sexually Abused: Not on file   Housing Stability:     Unable to Pay for Housing in the Last Year: Not on file    Number of Places Lived in the Last Year: Not on file    Unstable Housing in the Last Year: Not on file       Current Outpatient Medications   Medication Sig Dispense Refill    pregabalin (LYRICA) 150 MG capsule Take 1 capsule by mouth daily.  levETIRAcetam (KEPPRA) 500 MG tablet Take 1 tablet by mouth twice daily 60 tablet 5    acetaminophen (TYLENOL) 500 MG tablet Take 500 mg by mouth every 6 hours as needed for Pain      hydroCHLOROthiazide (HYDRODIURIL) 12.5 MG tablet Take 1 tablet by mouth 2 times daily      prednisoLONE 5 MG (21) TBPK Take by mouth      aspirin (ASPIRIN CHILDRENS) 81 MG chewable tablet Take 1 tablet by mouth daily      polyethylene glycol-propylene glycol (SYSTANE) 0.4-0.3 % GEL ophthalmic gel Systane (propylene glycol) 0.4 %-0.3 % eye drops   PRN      omeprazole (PRILOSEC) 20 MG delayed release capsule Take 1 capsule by mouth daily      Cholecalciferol (VITAMIN D3) 125 MCG (5000 UT) CAPS Take 1 capsule by mouth daily      leflunomide (ARAVA) 10 MG tablet Take 1 tablet by mouth daily       No current facility-administered medications for this visit.        Allergies   Allergen Reactions    Gabapentin Other (See Comments)     Other reaction(s): Lightheadedness  \"Slept for a long time\"      Methotrexate Other (See Comments)    Codeine Nausea And Vomiting         REVIEW OF SYSTEMS    Constitutional: []Fever []Sweat []Chills [] Recent Injury [x] Denies all unless marked  HEENT:[]Headache  [] Head Injury/Hearing Loss  [] Sore Throat  [] Ear Ache/Dizziness  [x] Denies all unless marked  Spine:  [] Neck pain  [] Back pain  [] Sciaticia  [x] Denies all unless marked  Cardiovascular:[]Heart Disease []Chest Pain [] Palpitations  [x] Denies all unless marked  Pulmonary: []Shortness of Breath []Cough   [x] Denies all unless marke  Gastrointestinal: []Nausea  []Vomiting  []Abdominal Pain  []Constipation  []Diarrhea  []Dark Bloody Stools  [x] Denies all unless marked  Psychiatric/Behavioral:[] Depression [] Anxiety [x] Denies all unless marked  Genitourinary:   [] Frequency  [] Urgency  [] Incontinence [] Pain with Urination  [x] Denies all unless marked  Extremities: []Pain  []Swelling  [x] Denies all unless marked  Musculoskeletal: [] Muscle Pain  [] Joint Pain  [] Arthritis [] Muscle Cramps [x] Muscle Twitches  [x] Denies all unless marked  Sleep: [] Insomnia [] Snoring [] Restless Legs [] Sleep Apnea  [] Daytime Sleepiness  [x] Denies all unless marked  Skin:[] Rash [] Skin Discoloration [x] Denies all unless marked   Neurological: []Visual Disturbance/Memory Loss [] Loss of Balance [] Slurred Speech/Weakness [] Seizures  [] Vertigo/Dizziness [x] Denies all unless marked        I have reviewed the above ROS with the patient and agree with the ROS as documented above. PHYSICAL EXAM    Constitutional -   /64   Pulse 64   Ht 5' 6\" (1.676 m)   Wt 160 lb (72.6 kg)   SpO2 98%   BMI 25.82 kg/m²   General appearance: No acute distress   EYES -   Conjunctiva normal  Pupillary exam as below, see CN exam in the neurologic exam  ENT-    No scars, masses, or lesions over external nose or ears  Hearing normal bilaterally to finger rub  Cardiovascular -   No clubbing, cyanosis, or edema   Pulmonary-   Good expansion, normal effort without use of accessory muscles  Musculoskeletal -   No significant wasting of muscles noted  Gait as below, see gait exam in the neurologic exam  Muscle strength, tone, stability as below. No bony deformities  Skin -   Warm, dry, and intact to inspection and palpation.     No rash, erythema, or pallor  Psychiatric -   Mood, affect, and behavior appear normal    Memory as below see mental status examination in the neurologic exam    NEUROLOGICAL EXAM    Mental status   [x]Awake, alert, oriented   [x]Affect attention and concentration appear appropriate  [x]Recent and remote memory appears unremarkable  [x]Speech normal without dysarthria or aphasia, comprehension and repetition intact. COMMENTS:    Cranial Nerves [x]No VF deficit to confrontation  [x]PERRLA, EOMI, no nystagmus, conjugate eye movements, no ptosis  [x]Face symmetric  [x]Facial sensation intact  [x]Tongue midline no atrophy or fasciculations present  [x]Palate midline, hearing to finger rub normal bilaterally  [x]Shoulder shrug and SCM testing normal bilaterally  COMMENTS:   Motor   [x]5/5 strength x 4 extremities  [x]Normal bulk and tone  []No tremor present  [x]No rigidity or bradykinesia noted  COMMENTS: Mild tremor, questionable bradykinesia, no rigidity. Sensory  [x]Sensation intact to light touch, pin prick, vibration, and proprioception BLE  [x]Sensation intact to light touch, pin prick, vibration, and proprioception BUE  COMMENTS: Decreased LT, PP, Vib BLE   Coordination [x]FTN normal bilaterally   []HTS normal bilaterally  []CHESTER normal bilaterally. COMMENTS:   Reflexes  [x]Symmetric and non-pathological  [x]Toes down going bilaterally  [x]No clonus present  COMMENTS:   Gait                  []Normal steady gait    []Ataxic    []Spastic     []Magnetic     []Shuffling  COMMENTS: Cautious, decreased arm swing       LABS RECORD AND IMAGING REVIEW (As below and per HPI)    Records reviewed. EEG intermittent left parasagittal spike discharges. MRI largely negative. NCS/EMG BLE c/w polyneuropathy. ASSESSMENT:    Wen Bailey is a 73y.o. year old female here for seizure disorder, vertigo, back pain, polyneuropathy, tremor. Questionable breakthrough events since last seen. Noting more vertigo, orthostasis and right upper extremity numbness. EEG is abnormal.  MRI largely negative. Underlying Dermatomyositis history noted as well. Noting back pain, neurosurgery is following. PLAN:  1. Increase Keppra to 750 mg bid. 2.  Epilepsy precautions and seizure first aid discussed. Driving per [employer].   No heights, swimming, tub baths, open flames, or heavy machinery. 3.  Follow up with cardiology as directed, blood pressure medication adjustments through primary for orthostatic symptoms, follow up with Rheumatology as directed. 4.  Follow up with neurosurgery for back pain. 5.  Repeat MRI given right upper extremity numbness and worsening vertigo. Consider C-spine imaging, NCS/EMG RUE pending brain imaging / if worsens. 6.  Follow neuropathy, on Lyrica  7. Follow tremor, no clear suggestion of parkinsonism on exam, will follow.      Juan Helm DO  Board Certified Neurology

## 2021-12-13 ENCOUNTER — HOSPITAL ENCOUNTER (OUTPATIENT)
Dept: MRI IMAGING | Age: 71
Discharge: HOME | End: 2021-12-13
Payer: MEDICARE

## 2021-12-13 DIAGNOSIS — G40.909 SEIZURE DISORDER (HCC): ICD-10-CM

## 2021-12-13 DIAGNOSIS — R20.0 NUMBNESS: ICD-10-CM

## 2021-12-13 LAB
GFR AFRICAN AMERICAN: 49
GFR NON-AFRICAN AMERICAN: 41
PERFORMED ON: ABNORMAL
POC CREATININE: 1.3 MG/DL (ref 0.3–1.3)
POC SAMPLE TYPE: ABNORMAL

## 2021-12-13 PROCEDURE — 70553 MRI BRAIN STEM W/O & W/DYE: CPT

## 2021-12-13 PROCEDURE — A9577 INJ MULTIHANCE: HCPCS | Performed by: PSYCHIATRY & NEUROLOGY

## 2021-12-13 PROCEDURE — 6360000004 HC RX CONTRAST MEDICATION: Performed by: PSYCHIATRY & NEUROLOGY

## 2021-12-13 PROCEDURE — 82565 ASSAY OF CREATININE: CPT

## 2021-12-13 RX ADMIN — GADOBENATE DIMEGLUMINE 15 ML: 529 INJECTION, SOLUTION INTRAVENOUS at 11:32

## 2021-12-20 ENCOUNTER — TELEPHONE (OUTPATIENT)
Dept: NEUROSURGERY | Age: 71
End: 2021-12-20

## 2021-12-20 NOTE — TELEPHONE ENCOUNTER
Petrona Iker would like a call to discuss her MRI results. her preferred call back time is  Anytime. Thank you!

## 2021-12-22 NOTE — TELEPHONE ENCOUNTER
Called patient per Dr Akin Paz to let her know that MRI nothing acute age related changes. Patient understood.

## 2022-05-10 RX ORDER — LEVETIRACETAM 500 MG/1
TABLET ORAL
Qty: 270 TABLET | Refills: 5 | Status: SHIPPED | OUTPATIENT
Start: 2022-05-10

## 2022-05-10 NOTE — TELEPHONE ENCOUNTER
Requested Prescriptions     Pending Prescriptions Disp Refills    levETIRAcetam (KEPPRA) 500 MG tablet [Pharmacy Med Name: LEVETIRACETAM 500 MG Tablet] 270 tablet 5     Sig: TAKE 1 AND 1/2 TABLETS TWICE DAILY         Last Office Visit: 12/6/2021  Next Office Visit: 7/25/2022  Last Medication Refill: 12/6/2021 with 5 refills      Jhonatan mane

## 2022-08-15 ENCOUNTER — OFFICE VISIT (OUTPATIENT)
Dept: NEUROSURGERY | Age: 72
End: 2022-08-15
Payer: MEDICARE

## 2022-08-15 VITALS
HEIGHT: 66 IN | WEIGHT: 171 LBS | DIASTOLIC BLOOD PRESSURE: 90 MMHG | SYSTOLIC BLOOD PRESSURE: 122 MMHG | BODY MASS INDEX: 27.48 KG/M2 | HEART RATE: 88 BPM | RESPIRATION RATE: 18 BRPM

## 2022-08-15 DIAGNOSIS — R26.89 ANTALGIC GAIT: ICD-10-CM

## 2022-08-15 DIAGNOSIS — M51.36 DDD (DEGENERATIVE DISC DISEASE), LUMBAR: ICD-10-CM

## 2022-08-15 DIAGNOSIS — M48.061 LUMBAR FORAMINAL STENOSIS: Primary | ICD-10-CM

## 2022-08-15 DIAGNOSIS — M54.41 CHRONIC MIDLINE LOW BACK PAIN WITH RIGHT-SIDED SCIATICA: ICD-10-CM

## 2022-08-15 DIAGNOSIS — G89.29 CHRONIC MIDLINE LOW BACK PAIN WITH RIGHT-SIDED SCIATICA: ICD-10-CM

## 2022-08-15 PROCEDURE — 1090F PRES/ABSN URINE INCON ASSESS: CPT | Performed by: NURSE PRACTITIONER

## 2022-08-15 PROCEDURE — G8417 CALC BMI ABV UP PARAM F/U: HCPCS | Performed by: NURSE PRACTITIONER

## 2022-08-15 PROCEDURE — G8400 PT W/DXA NO RESULTS DOC: HCPCS | Performed by: NURSE PRACTITIONER

## 2022-08-15 PROCEDURE — 3017F COLORECTAL CA SCREEN DOC REV: CPT | Performed by: NURSE PRACTITIONER

## 2022-08-15 PROCEDURE — G8427 DOCREV CUR MEDS BY ELIG CLIN: HCPCS | Performed by: NURSE PRACTITIONER

## 2022-08-15 PROCEDURE — 1036F TOBACCO NON-USER: CPT | Performed by: NURSE PRACTITIONER

## 2022-08-15 PROCEDURE — 1123F ACP DISCUSS/DSCN MKR DOCD: CPT | Performed by: NURSE PRACTITIONER

## 2022-08-15 PROCEDURE — 99214 OFFICE O/P EST MOD 30 MIN: CPT | Performed by: NURSE PRACTITIONER

## 2022-08-15 ASSESSMENT — ENCOUNTER SYMPTOMS
GASTROINTESTINAL NEGATIVE: 1
BACK PAIN: 1
EYES NEGATIVE: 1
RESPIRATORY NEGATIVE: 1

## 2022-08-15 NOTE — PROGRESS NOTES
[facility]  Office Visit      Chief Complaint   Patient presents with    Results     Imaging in Nucleus     Back Pain     Patient states she is having increased back pain. She was treated by her PCP for sciatica because she was in so much pain and went back within a few days due to continuous pain. She states that is when he ordered the MRI. She states he gave her Robaxin and she quit taking it because \"it makes me feel crazy. \" She states she takes extra strength Tylenol to help manage the pain. She states she does have times where she stumbles or feels off balance. Numbness     Patient states she does have numbness/tingling in her lower right leg that goes down to her toes. 8/15/2022: Ms. Calle returns to clinic today to review the repeat MRI lumbar spine. She states she continues to have low back and RLE pain that travels into the posterior and lateral thighs. She complains of right toes numbness that she just noticed today. She states as long as she is sitting her pain is tolerable. She continuous flexes forward, \"it's hard for me to straighten up\". Standing or prolonged periods of time the RLE bothers her most.      7/20/2021:  Ms Luis Gomez returns to the clinic today for follow up. She states after she finished PT her right leg pain improved and then moved to the left leg. She now feels that the pain is starting to radiate into the RLE again. She feels that her feet are swollen, however, visibly they are not.        6/24/2021: Ms. Luis Gomez returns to clinic today to discuss her progress with PT. She states that they actually dismissed her due to not making any progress. She now states that she has pain in BLE and not just the right. She also now has stabbing pains in the left side/flank area that is exacerbated with leaning to the left or moving and trying to get out of bed.   She states that she can barely go to [employer] and do 20 minutes of shopping before she wants to throw her basket down and leave due to the BLE weakness. She states that she was just seen by rheumatology and all of her inflammatory markers were unremarkable. HISTORY OF PRESENT ILLNESS:    Chang Tavera is a 81 y.o. female with a history of osteoporosis, dermatomyositis, and seizure disorder followed by Dr. Charlotte Roth who presents with low back and RLE pain that has been present for about 6 years when she broke her L4 vertebral body and sacral fractures. The pain does radiate into the right hip, posterolateral thigh, and lateral leg. She does describe right leg spasms and electric like sensations. Does have some left lateral thigh pain. Her pain is mostly located in the RLE. The patient denies numbness. Going from seated to standing position is very difficult for her. Standing makes the pain worse. Her pain is significantly worsened when going from a seated to standing position. Her pain is not changed with walking. Her pain is not changed when lying flat. Overall, indicative that the patient does have a mechanical nature to their pain. She states that 25% of her pain is located in the back and 75% is leg pain. The patient has underwent a non-operative treatment course that has included:  Lyrica  Oral Steroids (Medrol dose pack)  PT (a few months ago at [address])      Of note she does not use tobacco and does take blood thinning medications. ASA               Past Medical History:   Diagnosis Date    Dermatomycosis        Past Surgical History:   Procedure Laterality Date    FOOT SURGERY      GALLBLADDER SURGERY      HYSTERECTOMY, TOTAL ABDOMINAL (CERVIX REMOVED)      TUBAL LIGATION         Current Outpatient Medications   Medication Sig Dispense Refill    levETIRAcetam (KEPPRA) 500 MG tablet TAKE 1 AND 1/2 TABLETS TWICE DAILY 270 tablet 5    pregabalin (LYRICA) 150 MG capsule Take 1 capsule by mouth daily.       acetaminophen (TYLENOL) 500 MG tablet Take 500 mg by mouth every 6 hours as needed for Pain      hydroCHLOROthiazide (HYDRODIURIL) 12.5 MG tablet Take 1 tablet by mouth 2 times daily      prednisoLONE 5 MG (21) TBPK Take by mouth      aspirin 81 MG chewable tablet Take 1 tablet by mouth daily      polyethylene glycol-propylene glycol (SYSTANE) 0.4-0.3 % GEL ophthalmic gel Systane (propylene glycol) 0.4 %-0.3 % eye drops   PRN      omeprazole (PRILOSEC) 20 MG delayed release capsule Take 1 capsule by mouth daily      Cholecalciferol (VITAMIN D3) 125 MCG (5000 UT) CAPS Take 1 capsule by mouth daily      leflunomide (ARAVA) 10 MG tablet Take 1 tablet by mouth daily       No current facility-administered medications for this visit. Allergies:  Gabapentin, Methotrexate, and Codeine    Social History:   Social History     Tobacco Use   Smoking Status Never   Smokeless Tobacco Never     Social History     Substance and Sexual Activity   Alcohol Use Never         Family History:   No family history on file. REVIEW OF SYSTEMS:  Constitutional: Negative. HENT: Negative. Eyes: Negative. Respiratory: Negative. Cardiovascular: Negative. Gastrointestinal: Negative. Genitourinary: Negative. Musculoskeletal: Positive for back pain. Skin: Negative. Neurological: Negative. Endo/Heme/Allergies: Negative. Psychiatric/Behavioral: Negative. PHYSICAL EXAM:  Vitals:    08/15/22 1011   BP: (!) 122/90   Pulse: 88   Resp: 18     Constitutional: appears well-developed and well-nourished.    Eyes - conjunctiva normal.  Pupils react to light  Ear, nose, throat - hearing intact to finger rub, No scars, masses, or lesions over external nose or ears, no atrophy oftongue  Neck- symmetric, no masses noted, no jugular vein distension  Respiration- chest wall appears symmetric, good expansion, normal effort without use of accessory muscles  Musculoskeletal - no significant wasting of muscles noted, no bony deformities, gait no gross ataxia  Extremities- no clubbing, instability. Vascular calcifications   are present. Surgical clips are seen in the right upper quadrant of   the abdomen. Impression   1. Age-indeterminate L4 superior endplate deformity. 2. No evidence of instability. Multilevel degenerative changes. Signed by Dr Kiana Portillo       MRI Lumbar Spine (7/28/2022)   I have personally reviewed the images and my interpretation is: There is DDD throughout  There is a schmorl's node or chronic compression deformity of L4  L3-4 mild canal stenosis, mild to moderate left foraminal stenosis  L5-S1 moderate RIGHT foraminal stenosis          ASSESSMENT:    Ramana Melton is a 81 y.o. female with complaints of low back and BLE pain. ICD-10-CM    1. Lumbar foraminal stenosis  M48.061 81 [facility], [place], APRN, Pain Medicine, [place]      2. DDD (degenerative disc disease), lumbar  M51.36 81 [facility], [place], APRN, Pain Medicine, [place]      3. Chronic midline low back pain with right-sided sciatica  M54.41 81 [facility], [place], APRN, Pain Medicine, [place]    N15.98       4. Antalgic gait  R26.89 81 [facility], [place], APRN, Pain Medicine, [place]            PLAN:  -I have discussed and reviewed the results of the MRI lumbar spine with Ms. Hoskins at length. I explained that she has some moderate right sided foraminal stenosis at L5-S1, that is completely unchanged when compared to the previous MRI dated April 2021. At this point, given that she is not profoundly weak and the narrowing is not severe, I do not feel that putting her through a fusion surgery would be in her best interest at this time due to the fact that her pain would likely not improve to her expectation. I am going to recommend massage therapy, PT, pain management, etc.  I also discussed that she could have intrinsic hip issues.      -Refer to Pain Management Day  -Follow up as needed           ARMINDA Ernst

## 2022-08-30 ENCOUNTER — HOSPITAL ENCOUNTER (OUTPATIENT)
Dept: PAIN MANAGEMENT | Age: 72
Discharge: HOME | End: 2022-08-30
Payer: MEDICARE

## 2022-08-30 VITALS
HEIGHT: 66 IN | BODY MASS INDEX: 27.84 KG/M2 | TEMPERATURE: 97.2 F | WEIGHT: 173.25 LBS | OXYGEN SATURATION: 97 % | RESPIRATION RATE: 18 BRPM | SYSTOLIC BLOOD PRESSURE: 148 MMHG | DIASTOLIC BLOOD PRESSURE: 82 MMHG | HEART RATE: 65 BPM

## 2022-08-30 DIAGNOSIS — M54.16 LUMBAR RADICULOPATHY: ICD-10-CM

## 2022-08-30 PROCEDURE — 99213 OFFICE O/P EST LOW 20 MIN: CPT

## 2022-08-30 PROCEDURE — 99203 OFFICE O/P NEW LOW 30 MIN: CPT | Performed by: NURSE PRACTITIONER

## 2022-08-30 NOTE — H&P
[facility]    History and Physical    Patient Name: Shannon West    MR #: [id number]    Account #: [de-identified]    : 1950    Age: 81 y.o. Sex: female    Date: 2022    PCP: Jorje Irizarry         Referring Provider:    Chief Complaint:   Chief Complaint   Patient presents with    Back Pain       History of Present Illness:    Shannon West is a 81 y.o. female who presents to the office with primary complaints of low back pain that radiates down her right leg to toes. 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 Has a history of fractured sacrum. Explains that she has had low back pain since  and has gradually gotten worse. Says that exercise, standing, walking and bending forward makes the pain worse, while sitting seems to improve the pain. She has tried Aleve with no improvement. She has tried medrol dose pack, steroid injection and Robaxin. Says that the Robaxin seemed to help slightly and then stopped taking it. She has been to PT with no change in her pain. Has had MRI that showed foraminal stenosis at L5-S1. Discussed having LESI with patient and she wants to think about it before scheduling for the injection today. She has been to neurosurgery and no surgery is recommended. 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 Yes  [x]    No []   Did Physical Therapy make thepain better or worse? Better []   Worse []  Unchanged [x]     MRI in the last two years? Yes []  No [] [facility]  Results reviewed with patient? Yes [x]   No []    CT Scan in the last two years? Yes  []   No [x]  Results reviewed with patient? Yes []   No []     X-ray in the last two years? Yes []   No  [x]  Results reviewed with patient?    Yes  []  No []     Injections in the past?  Yes []   No []   Did the injections help relieve the pain? Yes []   No []     Do you have Depression? Yes  []    No [x]  Thinking of harming yourself or others? Yes  []   No [x]    Past Medical Histoy  Past Medical History:   Diagnosis Date    Dermatomycosis        Surgery History  Past Surgical History:   Procedure Laterality Date    FOOT SURGERY      GALLBLADDER SURGERY      HYSTERECTOMY, TOTAL ABDOMINAL (CERVIX REMOVED)      TUBAL LIGATION          Allergies  Gabapentin, Methotrexate, and Codeine     Current Medications  Current Outpatient Medications   Medication Sig Dispense Refill    levETIRAcetam (KEPPRA) 500 MG tablet TAKE 1 AND 1/2 TABLETS TWICE DAILY 270 tablet 5    pregabalin (LYRICA) 150 MG capsule Take 1 capsule by mouth daily. acetaminophen (TYLENOL) 500 MG tablet Take 500 mg by mouth every 6 hours as needed for Pain      hydroCHLOROthiazide (HYDRODIURIL) 12.5 MG tablet Take 1 tablet by mouth 2 times daily      prednisoLONE 5 MG (21) TBPK Take by mouth      aspirin 81 MG chewable tablet Take 1 tablet by mouth daily      polyethylene glycol-propylene glycol (SYSTANE) 0.4-0.3 % GEL ophthalmic gel Systane (propylene glycol) 0.4 %-0.3 % eye drops   PRN      omeprazole (PRILOSEC) 20 MG delayed release capsule Take 1 capsule by mouth daily      Cholecalciferol (VITAMIN D3) 125 MCG (5000 UT) CAPS Take 1 capsule by mouth daily      leflunomide (ARAVA) 10 MG tablet Take 1 tablet by mouth daily       No current facility-administered medications for this encounter. Social History    Social History     Tobacco Use    Smoking status: Never    Smokeless tobacco: Never   Substance Use Topics    Alcohol use: Never         Family History  family history is not on file.     Review of Systems:  Constitutional: denies fever, chills, fatigue, change in appetite, weight gain or weight loss  Head: Normocephalic  Skin: denies easy bruising, skin redness, skin rash, hives, sensitivity to sun exposure, tightness, nodules or bumps, hair loss, color changes in the hands or feet, or feeling of temperature change such as coldness  Eyes: denies pain, redness, loss of vision, double or blurred vision, eye drainage, or dryness. ENT and Mouth: denies ringing in the ears, loss of hearing, nasal congestion, nasal discharge, no hoarseness, sore throat, or difficulty swallowing   Respiratory: denies chronic dry cough, coughing up blood, coughing up mucus, waking at night coughing or choking, or wheezing  Cardiovascular: denies chest pain, irregular heartbeats, palpitations, shortness of breath, or edema in legs  Gastrointestinal: denies, nausea, vomiting, heartburn, diarrhea, constipation  Genitourinary: denies difficult urination, pain or burning with urination, blood in the urine, or cloudy urine  Musculoskeletal: denies arm, buttock, thigh or calf cramps. Has pain in low back that radiates down right leg to toes. Tenderness in low back. No muscle weakness. No joint swelling. Neurologic: headache, dizziness, fainting, loss of consciousness, no sensitivity, no memory loss. .    Endocrine: denies intolerance to hot or cold temperature, night sweats, flushing, fingernail changes, increased thirst, or hairloss   Hematologic/ Lymphatic: denies anemia, bleeding tendency or clotting tendency, bruising easily. Allergic/ Immunologic: denies rhinitis, asthma, skin sensitivity, or Latex allergy  Psychiatric: denies depression or thoughts of suicide, or voices in head.        Current Pain Assessment:        Clinical Progression: gradually improving  Effect of Pain on Daily Activities: limits activity  Patient's Stated Pain Goal: No pain  Pain Intervention(s): Medication (see eMar), Repositioning, Rest, Ice    Current PE    ORT Score: 10    PHQ-9 Score: 0    Physical Exam:    Vitals:    22 1049   BP: (!) 148/82   Pulse: 65   Resp: 18   Temp: 97.2 °F (36.2 °C)   TempSrc: Skin   SpO2: 97%   Weight: 173 lb 4 oz (78.6 kg)   Height: 5' 6\" (1.676 m)       Body mass index is 27.96 kg/m². General Appearance: No acute distress. Appears to be well dressed  Skin Exam: Warm and dry, no jaundice, rashes or leasions  Head Exam: NCAT, PERRLA, EOMI, scalp normal  Eye Exam: PERRLA, EOMI, conjunctivae clear  Ear Exam: Normal external auricles. No drainage from ear canals  Nose Exam: Normal alignment. Turbinates clear. No drainage  Mouth Exam: Oral mucosa pink and moist. Gums pink. Throat Exam: Posterior pharynx pink in color with no edema  Neck Exam: Supple, trachea midline. No masses palpated. Respiratory Exam: Clear to ausculation in all lobes anterior and posterior. Cardiovascular Exam: Regular rate and rhythm, no gallops, no rubs or murmurs. No edema in extremities  Gastrointestinal Exam: Bowel sounds in all quadrants, soft, non-distended, non-tender with palpation, no guarding   Musculoskeletal Exam: No joint swelling or deformity. Back Exam: Positive straight leg raise right. Pain across low back  Hip Exam: Full rotation bilateral  Shoulder Exam: Full rotation bilateral  Knee Exam: Full flexion and extension bilateral  Extremities: No rash, cyanosis or bruising  Neurologic Exam: Gait and coordination normal, speech normal and clear  Reflexes: Normal brachialis, Negative Rodriges's bilateral. Normal Patellar bilateral,   CN EXAM: II-XII intact, face symmetrical, tongue symmetrical, the trapezius and sternocleidomastoid muscle appearance and strength symmetrical, normal achilles bilateral, ankle clonus negative bilateral  Strength: 5/5 RUE Bi's/Tri's, 5/5 LUE Bi's/Tri's, 5/5 RLE knee flex/ext, 5/5 RLE DF/PF, 5/5 LLE knee flex/ext, 5/5 LLE DF/PF  Sensation: Equal and intact to fine touch in all extremities  Mood and affect: Normal limits  Nurses note reviewed along with current vital signs    Active Problem(s)  Active Problems:    Lumbar radiculopathy  Resolved Problems:    * No resolved hospital problems.  * PLAN:  1. Patient is to call the office with any questions or concerns that may arise prior to next appointment. 2. Patient may call and schedule LESI level L5-S1 over the phone. Will need follow-up scheduled. 3. Otherwise make prn    Patient take 81 mg ASA daily per self. Not prescribed by anyone    Urine Drug Screen Today:   Yes  []    No  [x]     Discussion:  Discussed exam findings and plan of care with patient. Patient agreed with the current plan of care at this time. All questions from the patient were answered by the provider. Activity:   Discussed exercise as beneficial to pain reduction, encouraged stretching exercise with a focus on torso strengthening. Education Provided:  Review of Terrell Ricci [x]  Agreement Review  [x]  Reviewed PHQ-9  [x]      Reviewed ORT [x]  Review of Test  [x]  Compliance Issues Discussed [x]   Cognitive Behavior Needs  []  Exercise  [x]  Financial Issues  []   Tobacco/Alcohol Use  []  Teaching  [x]   New Patient Picture Obtained  [x]      [] Benzodiazapine's and Narcotics:  Patient educated on the possible effects of combining Benzodiazapine's and Opioids. Explained \"Black Box Warnings\" such as; possible suppressed breathing, hypoxia, anoxia, depressed cognition, heart arrhythmia, coma and possible death. Patient verbalized understanding concerning possible effects. Controlled Substance Monitoring:   Attestation: The REGINO report for this patient was reviewed today. Discussed with patient possible medication side effects, risk of tolerance, dependence and alternative treatments. Discussed thegrowing epidemic in the [place] with the overprescribing and at times the abuse of narcotics. Discussed the detrimental effects of long term narcotic use. Patient encouraged to set daily goals of exercising and decreasing dailynarcotic intake.    Discussed with the patient about the development of hyperalgesia with long term narcotic intake. EMR dragon/transcription disclaimer: Much of this encounter note is electronic transcription/translation of spoken language to printed tach. Electronic translation of spoken language may be erroneous, or at times, nonsensical words or phrases may be inadvertently transcribed. Although, I have reviewed the note for such errors, some may still exist.    CC:  Vonnie Pereira    Thank you for this kind referral and allowing me to participate    in your patients care.     1 [facility], [facility], 8/30/2022 at 12:04 PM

## 2022-08-30 NOTE — PROGRESS NOTES
Clinic Documentation      Education Provided:  [x] Review of Lucero Robins  [] Agreement Review  [x] PEG Score Calculated [x] PHQ Score Calculated [x] ORT Score Calculated    [] Compliance Issues Discussed [] Cognitive Behavior Needs [x] Exercise [] Review of Test [] Financial Issues  [x] Tobacco/Alcohol Use Reviewed [x] Teaching [x] New Patient [x] Picture Obtained    Physician Plan:  [] Outgoing Referral  [] Pharmacy Consult  [] Test Ordered [] Prescription Ordered/Changed   [] Obtained Test Results / Consult Notes        Complete if patient is withholding blood thinner for procedure     Blood Thinner Patient is currently taking:      [] Plavix (Hold for 7 days)  [] Aspirin (Hold for 5 days)     [] Pletal (Hold for 2 days)  [] Pradaxa (Hold for 3 days)    [] Effient (Hold for 7 days)  [] Xarelto (Hold for 2 days)    [] Eliquis (Hold for 2 days)  [] Brilinta (Hold for 7 days)    [] Coumadin (Hold for 5 days) - (INR needs to be drawn the day prior to procedure- INR < 2.0)    [] Aggrenox (Hold for 7 days)        [] Patient will stop medication on their own.    [] Blood Thinner Form Faxed for approval to hold.    Provider form faxed to:    Assessment Completed by:  Electronically signed by Flora Jaramillo RN on 8/30/2022 at 11:08 AM

## 2022-09-21 ENCOUNTER — TELEPHONE (OUTPATIENT)
Dept: PAIN MANAGEMENT | Age: 72
End: 2022-09-21

## 2022-09-21 NOTE — TELEPHONE ENCOUNTER
Call placed to patient to remind her to hold asa x5 days starting tomorrow for LESI next Tuesday. Patient states understanding.

## 2022-09-27 ENCOUNTER — HOSPITAL ENCOUNTER (OUTPATIENT)
Dept: PAIN MANAGEMENT | Age: 72
Discharge: HOME | End: 2022-09-27
Payer: MEDICARE

## 2022-09-27 VITALS
TEMPERATURE: 96.5 F | OXYGEN SATURATION: 96 % | RESPIRATION RATE: 18 BRPM | SYSTOLIC BLOOD PRESSURE: 166 MMHG | HEART RATE: 61 BPM | DIASTOLIC BLOOD PRESSURE: 77 MMHG

## 2022-09-27 DIAGNOSIS — R52 PAIN MANAGEMENT: ICD-10-CM

## 2022-09-27 PROCEDURE — 2500000003 HC RX 250 WO HCPCS

## 2022-09-27 PROCEDURE — 2580000003 HC RX 258

## 2022-09-27 PROCEDURE — 3209999900 FLUORO FOR SURGICAL PROCEDURES

## 2022-09-27 PROCEDURE — A4216 STERILE WATER/SALINE, 10 ML: HCPCS

## 2022-09-27 PROCEDURE — 62323 NJX INTERLAMINAR LMBR/SAC: CPT

## 2022-09-27 PROCEDURE — 6360000002 HC RX W HCPCS

## 2022-09-27 RX ORDER — LIDOCAINE HYDROCHLORIDE 10 MG/ML
5 INJECTION, SOLUTION EPIDURAL; INFILTRATION; INTRACAUDAL; PERINEURAL ONCE
Status: DISCONTINUED | OUTPATIENT
Start: 2022-09-27 | End: 2022-09-29 | Stop reason: HOSPADM

## 2022-09-27 RX ORDER — SODIUM CHLORIDE 9 MG/ML
5 INJECTION INTRAVENOUS ONCE
Status: DISCONTINUED | OUTPATIENT
Start: 2022-09-27 | End: 2022-09-29 | Stop reason: HOSPADM

## 2022-09-27 RX ORDER — METHYLPREDNISOLONE ACETATE 80 MG/ML
80 INJECTION, SUSPENSION INTRA-ARTICULAR; INTRALESIONAL; INTRAMUSCULAR; SOFT TISSUE ONCE
Status: DISCONTINUED | OUTPATIENT
Start: 2022-09-27 | End: 2022-09-29 | Stop reason: HOSPADM

## 2022-09-27 ASSESSMENT — PAIN - FUNCTIONAL ASSESSMENT
PAIN_FUNCTIONAL_ASSESSMENT: PREVENTS OR INTERFERES SOME ACTIVE ACTIVITIES AND ADLS
PAIN_FUNCTIONAL_ASSESSMENT: NONE - DENIES PAIN
PAIN_FUNCTIONAL_ASSESSMENT: PREVENTS OR INTERFERES SOME ACTIVE ACTIVITIES AND ADLS

## 2022-09-27 ASSESSMENT — PAIN DESCRIPTION - FREQUENCY: FREQUENCY: INTERMITTENT

## 2022-09-27 ASSESSMENT — PAIN DESCRIPTION - LOCATION: LOCATION: BACK

## 2022-09-27 ASSESSMENT — PAIN DESCRIPTION - ORIENTATION: ORIENTATION: LOWER

## 2022-09-27 ASSESSMENT — PAIN DESCRIPTION - PAIN TYPE: TYPE: CHRONIC PAIN

## 2022-09-27 ASSESSMENT — PAIN DESCRIPTION - DESCRIPTORS
DESCRIPTORS: ACHING
DESCRIPTORS: SHARP;ACHING

## 2022-09-27 ASSESSMENT — PAIN DESCRIPTION - DIRECTION: RADIATING_TOWARDS: DOES NOT RADIATE TODAY

## 2022-09-27 ASSESSMENT — PAIN SCALES - GENERAL: PAINLEVEL_OUTOF10: 2

## 2022-09-27 NOTE — INTERVAL H&P NOTE
Update History & Physical    The patient's History and Physical was reviewed with the patient and I examined the patient. There was no change. The surgical site was confirmed by the patient and me. Plan: The risks, benefits, expected outcome, and alternative to the recommended procedure have been discussed with the patient. Patient understands and wants to proceed with the procedure.      Electronically signed by Sagar Snowden MD on 9/27/2022 at 10:35 AM

## 2022-10-03 ENCOUNTER — TELEPHONE (OUTPATIENT)
Dept: PAIN MANAGEMENT | Age: 72
End: 2022-10-03

## 2022-10-19 ENCOUNTER — TELEPHONE (OUTPATIENT)
Dept: NEUROSURGERY | Age: 72
End: 2022-10-19

## 2022-10-19 NOTE — TELEPHONE ENCOUNTER
Patient called into [facility] to reschedule an appointment that she has on 10/31 with Dr. Prince Dyson due to another appointment in [facility]. I informed the patient that Rosalva Garcia next available appointment is not until Feb 2023. Pt is a seizure patient and couldn't accommodate an appt with the APRN. Pt voiced she will keep the appt and will call us if anything changes.

## 2022-10-20 ENCOUNTER — TELEPHONE (OUTPATIENT)
Dept: PAIN MANAGEMENT | Age: 72
End: 2022-10-20

## 2022-10-20 NOTE — TELEPHONE ENCOUNTER
Patient called nurse line regarding pain in her low back and hip that is recurrent. She states that this happens about every two months. The last episode was in August.  Since then she has had a lumbar epidural.  She gained minimal relief. She said that the same symptoms have returned last week. She reports pain to her hip that radiates down the back of her thigh and into her calf. She states weakness in the leg, and cannot bear weight. She said she is unable to really walk any distance at all and is afraid of falling. She is not sure that there is really anything else that can be done right now, but is wanting Margaux to be aware. I let her know that I will message Margaux and call her back. She is scheduled for the office on 11/9.

## 2022-10-31 ENCOUNTER — TELEPHONE (OUTPATIENT)
Dept: PAIN MANAGEMENT | Age: 72
End: 2022-10-31

## 2022-10-31 ENCOUNTER — OFFICE VISIT (OUTPATIENT)
Dept: NEUROLOGY | Age: 72
End: 2022-10-31
Payer: MEDICARE

## 2022-10-31 VITALS
OXYGEN SATURATION: 98 % | BODY MASS INDEX: 27.8 KG/M2 | WEIGHT: 173 LBS | HEART RATE: 73 BPM | DIASTOLIC BLOOD PRESSURE: 70 MMHG | HEIGHT: 66 IN | SYSTOLIC BLOOD PRESSURE: 122 MMHG

## 2022-10-31 DIAGNOSIS — G89.29 CHRONIC BILATERAL LOW BACK PAIN WITHOUT SCIATICA: ICD-10-CM

## 2022-10-31 DIAGNOSIS — M54.50 CHRONIC BILATERAL LOW BACK PAIN WITHOUT SCIATICA: ICD-10-CM

## 2022-10-31 DIAGNOSIS — G40.909 SEIZURE DISORDER (HCC): Primary | ICD-10-CM

## 2022-10-31 DIAGNOSIS — R42 VERTIGO: ICD-10-CM

## 2022-10-31 DIAGNOSIS — G62.9 POLYNEUROPATHY: ICD-10-CM

## 2022-10-31 PROBLEM — K90.0 CELIAC DISEASE: Status: ACTIVE | Noted: 2018-07-25

## 2022-10-31 PROBLEM — M51.369 DEGENERATION OF LUMBAR INTERVERTEBRAL DISC: Status: ACTIVE | Noted: 2018-07-25

## 2022-10-31 PROBLEM — K27.9 PEPTIC ULCER: Status: ACTIVE | Noted: 2018-07-25

## 2022-10-31 PROBLEM — I10 BENIGN ESSENTIAL HYPERTENSION: Status: ACTIVE | Noted: 2018-07-25

## 2022-10-31 PROBLEM — K21.9 GASTROESOPHAGEAL REFLUX DISEASE WITHOUT ESOPHAGITIS: Status: ACTIVE | Noted: 2022-01-18

## 2022-10-31 PROBLEM — M33.90 DERMATOMYOSITIS (HCC): Status: ACTIVE | Noted: 2022-01-18

## 2022-10-31 PROBLEM — G50.0 TRIGEMINAL NEURALGIA: Status: ACTIVE | Noted: 2022-05-02

## 2022-10-31 PROBLEM — S32.000A COMPRESSION FRACTURE OF LUMBAR VERTEBRA (HCC): Status: ACTIVE | Noted: 2018-07-25

## 2022-10-31 PROBLEM — E55.9 VITAMIN D DEFICIENCY: Status: ACTIVE | Noted: 2018-07-25

## 2022-10-31 PROBLEM — M51.36 DEGENERATION OF LUMBAR INTERVERTEBRAL DISC: Status: ACTIVE | Noted: 2018-07-25

## 2022-10-31 PROBLEM — M51.06 DEGENERATION OF LUMBOSACRAL INTERVERTEBRAL DISC WITH MYELOPATHY: Status: ACTIVE | Noted: 2022-07-28

## 2022-10-31 PROBLEM — L93.0 LUPUS ERYTHEMATOSUS: Status: ACTIVE | Noted: 2019-01-08

## 2022-10-31 PROBLEM — M81.0 OSTEOPOROSIS: Status: ACTIVE | Noted: 2018-07-25

## 2022-10-31 PROBLEM — U07.1 COVID-19: Status: ACTIVE | Noted: 2022-06-08

## 2022-10-31 PROBLEM — M33.13 DERMATOMYOSITIS (HCC): Status: ACTIVE | Noted: 2022-01-18

## 2022-10-31 PROCEDURE — 3078F DIAST BP <80 MM HG: CPT | Performed by: PSYCHIATRY & NEUROLOGY

## 2022-10-31 PROCEDURE — 1123F ACP DISCUSS/DSCN MKR DOCD: CPT | Performed by: PSYCHIATRY & NEUROLOGY

## 2022-10-31 PROCEDURE — G8400 PT W/DXA NO RESULTS DOC: HCPCS | Performed by: PSYCHIATRY & NEUROLOGY

## 2022-10-31 PROCEDURE — 99214 OFFICE O/P EST MOD 30 MIN: CPT | Performed by: PSYCHIATRY & NEUROLOGY

## 2022-10-31 PROCEDURE — 3017F COLORECTAL CA SCREEN DOC REV: CPT | Performed by: PSYCHIATRY & NEUROLOGY

## 2022-10-31 PROCEDURE — G8417 CALC BMI ABV UP PARAM F/U: HCPCS | Performed by: PSYCHIATRY & NEUROLOGY

## 2022-10-31 PROCEDURE — 1090F PRES/ABSN URINE INCON ASSESS: CPT | Performed by: PSYCHIATRY & NEUROLOGY

## 2022-10-31 PROCEDURE — 3074F SYST BP LT 130 MM HG: CPT | Performed by: PSYCHIATRY & NEUROLOGY

## 2022-10-31 PROCEDURE — G8484 FLU IMMUNIZE NO ADMIN: HCPCS | Performed by: PSYCHIATRY & NEUROLOGY

## 2022-10-31 PROCEDURE — G8427 DOCREV CUR MEDS BY ELIG CLIN: HCPCS | Performed by: PSYCHIATRY & NEUROLOGY

## 2022-10-31 PROCEDURE — 1036F TOBACCO NON-USER: CPT | Performed by: PSYCHIATRY & NEUROLOGY

## 2022-10-31 RX ORDER — AZATHIOPRINE 50 MG/1
1 TABLET ORAL DAILY
COMMUNITY
Start: 2022-10-16

## 2022-10-31 RX ORDER — MAGNESIUM 30 MG
30 TABLET ORAL 2 TIMES DAILY
COMMUNITY

## 2022-10-31 RX ORDER — PREGABALIN 300 MG/1
1 CAPSULE ORAL 2 TIMES DAILY
COMMUNITY
Start: 2022-09-07

## 2022-10-31 RX ORDER — UBIDECARENONE 75 MG
50 CAPSULE ORAL DAILY
COMMUNITY

## 2022-10-31 NOTE — TELEPHONE ENCOUNTER
Call placed to patient. I let her know that there is no sooner appointments available at this time. She is scheduled to be seen in the office on 11/9. I instructed her that if she is having severe pain or problems before that, she may go to the ER to be seen. Patient verbalized understanding.

## 2022-10-31 NOTE — PROGRESS NOTES
[id number] [facility] Neurology Office Note      Patient:   Mikayla Dumont  MR#:    [id number]  Account Number:                         YOB: 1950  Date of Evaluation:  10/31/2022  Time of Note:                          11:37 AM  Primary/Referring Physician:  Tish Khalil MD   Consulting Physician:  Suzanne Ceja DO    FOLLOW UP VISIT      Chief Complaint   Patient presents with    Follow-up    Seizures     No seizures to report     Results     Results from recent MRI       [facility]    Mikayla Dumont is a 81y.o. year old female here for possible seizures. Patient had a possible event back in October, left arm shaking, no ALANNAH, no tongue biting, no incontinence. Prior events described as feeling unusual, an atypical sensation, followed by a ALANNAH, possible GTC event, patient does not recall the event and does not recall the ambulance ride to the hospital.   No tongue biting. No incontinence. Vertigo noted as well, unchanged. Some possible orthostatic symptoms as well. No chest pain or palpitations. No history of TBI, meningitis, or encephalitis. No family history of seizures. She has had a cardiac monitor. She was hospitalized in [place], [place]. No facial drooping, slurred speech, no focal weakness or numbness. No prior stroke history. No prior seizure history. Cardiology is following. CD, ECHO previously completed and was largely negative. CT head negative per patient. She is not on seizure medication currently. Dermatomyositis history noted. Followed by Dr. Ventura Gould, was on MTX and has been stopped since the episode. Blood pressure medications adjusted for possible orthostatic symptoms, but still noted. EEG abnormal, on Keppra. No overt side effects. MRI with nothing acute. Back pain noted, neurosurgery is following. Noting some possible right upper extremity numbness intermittently as well, unchanged. Repeat MRI negative.          Past Medical History:   Diagnosis Date    Dermatomycosis        Past Surgical History:   Procedure Laterality Date    FOOT SURGERY      GALLBLADDER SURGERY      HYSTERECTOMY, TOTAL ABDOMINAL (CERVIX REMOVED)      TUBAL LIGATION         No family history on file. Social History     Socioeconomic History    Marital status:      Spouse name: Not on file    Number of children: Not on file    Years of education: Not on file    Highest education level: Not on file   Occupational History    Not on file   Tobacco Use    Smoking status: Never    Smokeless tobacco: Never   Vaping Use    Vaping Use: Never used   Substance and Sexual Activity    Alcohol use: Never    Drug use: Never    Sexual activity: Yes     Partners: Male   Other Topics Concern    Not on file   Social History Narrative    Not on file     Social Determinants of Health     Financial Resource Strain: Not on file   Food Insecurity: Not on file   Transportation Needs: Not on file   Physical Activity: Not on file   Stress: Not on file   Social Connections: Not on file   Intimate Partner Violence: Not on file   Housing Stability: Not on file       Current Outpatient Medications   Medication Sig Dispense Refill    azaTHIOprine (IMURAN) 50 MG tablet Take 1 tablet by mouth daily      pregabalin (LYRICA) 300 MG capsule Take 1 capsule by mouth 2 times daily.       vitamin B-12 (CYANOCOBALAMIN) 100 MCG tablet Take 50 mcg by mouth daily      magnesium 30 MG tablet Take 30 mg by mouth 2 times daily      levETIRAcetam (KEPPRA) 500 MG tablet TAKE 1 AND 1/2 TABLETS TWICE DAILY 270 tablet 5    acetaminophen (TYLENOL) 500 MG tablet Take 500 mg by mouth every 6 hours as needed for Pain      hydroCHLOROthiazide (HYDRODIURIL) 12.5 MG tablet Take 1 tablet by mouth 2 times daily      aspirin 81 MG chewable tablet Take 1 tablet by mouth daily      polyethylene glycol-propylene glycol (SYSTANE) 0.4-0.3 % GEL ophthalmic gel Systane (propylene glycol) 0.4 %-0.3 % eye drops   PRN      omeprazole (PRILOSEC) 20 MG delayed release capsule Take 1 capsule by mouth daily      Cholecalciferol (VITAMIN D3) 125 MCG (5000 UT) CAPS Take 1 capsule by mouth daily      leflunomide (ARAVA) 10 MG tablet Take 1 tablet by mouth daily       No current facility-administered medications for this visit. Allergies   Allergen Reactions    Gabapentin Other (See Comments)     Other reaction(s): Lightheadedness  \"Slept for a long time\"      Methotrexate Other (See Comments)    Codeine Nausea And Vomiting         REVIEW OF SYSTEMS    Constitutional: []Fever []Sweat []Chills [] Recent Injury [x] Denies all unless marked  HEENT:[]Headache  [] Head Injury/Hearing Loss  [] Sore Throat  [] Ear Ache/Dizziness  [x] Denies all unless marked  Spine:  [] Neck pain  [] Back pain  [] Sciaticia  [x] Denies all unless marked  Cardiovascular:[]Heart Disease []Chest Pain [] Palpitations  [x] Denies all unless marked  Pulmonary: []Shortness of Breath []Cough   [x] Denies all unless marke  Gastrointestinal: []Nausea  []Vomiting  []Abdominal Pain  []Constipation  []Diarrhea  []Dark Bloody Stools  [x] Denies all unless marked  Psychiatric/Behavioral:[] Depression [] Anxiety [x] Denies all unless marked  Genitourinary:   [] Frequency  [] Urgency  [] Incontinence [] Pain with Urination  [x] Denies all unless marked  Extremities: [x]Pain  []Swelling  [x] Denies all unless marked  Musculoskeletal: [x] Muscle Pain  [] Joint Pain  [] Arthritis [x] Muscle Cramps [x] Muscle Twitches  [x] Denies all unless marked  Sleep: [] Insomnia [] Snoring [] Restless Legs [] Sleep Apnea  [] Daytime Sleepiness  [x] Denies all unless marked  Skin:[] Rash [] Skin Discoloration [x] Denies all unless marked   Neurological: []Visual Disturbance/Memory Loss [] Loss of Balance [] Slurred Speech/Weakness [] Seizures  [] Vertigo/Dizziness [x] Denies all unless marked         I have reviewed the above ROS with the patient and agree with the ROS as documented above.          PHYSICAL EXAM    Constitutional -   /70   Pulse 73   Ht 5' 6\" (1.676 m)   Wt 173 lb (78.5 kg)   SpO2 98%   BMI 27.92 kg/m²   General appearance: No acute distress   EYES -   Conjunctiva normal  Pupillary exam as below, see CN exam in the neurologic exam  ENT-    No scars, masses, or lesions over external nose or ears  Hearing normal bilaterally to finger rub  Cardiovascular -   No clubbing, cyanosis, or edema   Pulmonary-   Good expansion, normal effort without use of accessory muscles  Musculoskeletal -   No significant wasting of muscles noted  Gait as below, see gait exam in the neurologic exam  Muscle strength, tone, stability as below. No bony deformities  Skin -   Warm, dry, and intact to inspection and palpation. No rash, erythema, or pallor  Psychiatric -   Mood, affect, and behavior appear normal    Memory as below see mental status examination in the neurologic exam    NEUROLOGICAL EXAM    Mental status   [x]Awake, alert, oriented   [x]Affect attention and concentration appear appropriate  [x]Recent and remote memory appears unremarkable  [x]Speech normal without dysarthria or aphasia, comprehension and repetition intact. COMMENTS:    Cranial Nerves [x]No VF deficit to confrontation  [x]PERRLA, EOMI, no nystagmus, conjugate eye movements, no ptosis  [x]Face symmetric  [x]Facial sensation intact  [x]Tongue midline no atrophy or fasciculations present  [x]Palate midline, hearing to finger rub normal bilaterally  [x]Shoulder shrug and SCM testing normal bilaterally  COMMENTS:   Motor   [x]5/5 strength x 4 extremities  [x]Normal bulk and tone  []No tremor present  [x]No rigidity or bradykinesia noted  COMMENTS: Mild tremor, questionable bradykinesia, no rigidity.     Sensory  [x]Sensation intact to light touch, pin prick, vibration, and proprioception BLE  [x]Sensation intact to light touch, pin prick, vibration, and proprioception BUE  COMMENTS: Decreased LT, PP, Vib BLE   Coordination [x]FTN normal bilaterally   []HTS normal bilaterally  []CHESTER normal bilaterally. COMMENTS:   Reflexes  [x]Symmetric and non-pathological  [x]Toes down going bilaterally  [x]No clonus present  COMMENTS:   Gait                  []Normal steady gait    []Ataxic    []Spastic     []Magnetic     []Shuffling  COMMENTS: Cautious, decreased arm swing       LABS RECORD AND IMAGING REVIEW (As below and per HPI)    Records reviewed. EEG intermittent left parasagittal spike discharges. MRI largely negative. NCS/EMG BLE c/w polyneuropathy. ASSESSMENT:    Samantha English is a 81y.o. year old female here for seizure disorder, vertigo, back pain, polyneuropathy, tremor. No clear breakthrough events since last seen. Vertigo, orthostasis and right upper extremity numbness unchanged. EEG is abnormal.  Repeat MRI largely negative. Underlying Dermatomyositis history noted as well. Back pain unchanged, neurosurgery is following. Question mild subtle parkinsonism, unclear. PLAN:  1. Continue Keppra 750 mg bid. 2.  Epilepsy precautions and seizure first aid discussed. Driving per [employer]. No heights, swimming, tub baths, open flames, or heavy machinery. 3.  Follow up with cardiology as directed, blood pressure medication adjustments through primary for orthostatic symptoms, follow up with Rheumatology as directed. 4.  Follow up with neurosurgery for back pain. 5.  Consider C-spine imaging, NCS/EMG RUE if worsens. 6.  Follow neuropathy, on Lyrica  7. Follow tremor, no overt suggestion of parkinsonism on exam, but will follow.      Yeyo Pena DO  Board Certified Neurology

## 2022-11-09 ENCOUNTER — HOSPITAL ENCOUNTER (OUTPATIENT)
Dept: PAIN MANAGEMENT | Age: 72
Discharge: HOME | End: 2022-11-09
Payer: MEDICARE

## 2022-11-09 VITALS
BODY MASS INDEX: 28.77 KG/M2 | TEMPERATURE: 97 F | OXYGEN SATURATION: 96 % | SYSTOLIC BLOOD PRESSURE: 108 MMHG | DIASTOLIC BLOOD PRESSURE: 68 MMHG | WEIGHT: 179 LBS | HEART RATE: 70 BPM | HEIGHT: 66 IN

## 2022-11-09 PROCEDURE — 99213 OFFICE O/P EST LOW 20 MIN: CPT | Performed by: NURSE PRACTITIONER

## 2022-11-09 PROCEDURE — 99213 OFFICE O/P EST LOW 20 MIN: CPT

## 2022-11-09 ASSESSMENT — PAIN DESCRIPTION - LOCATION: LOCATION: BACK

## 2022-11-09 ASSESSMENT — PAIN SCALES - GENERAL: PAINLEVEL_OUTOF10: 2

## 2022-11-09 ASSESSMENT — PAIN DESCRIPTION - PAIN TYPE: TYPE: CHRONIC PAIN

## 2022-11-09 NOTE — PROGRESS NOTES
Clinic Documentation      Education Provided:  [x] Review of Sarthak Mix  [] Agreement Review  [x] PEG Score Calculated [] PHQ Score Calculated [] ORT Score Calculated    [] Compliance Issues Discussed [] Cognitive Behavior Needs [x] Exercise [] Review of Test [] Financial Issues  [x] Tobacco/Alcohol Use Reviewed [x] Teaching [] New Patient [] Picture Obtained    Physician Plan:  [] Outgoing Referral  [] Pharmacy Consult  [] Test Ordered [] Prescription Ordered/Changed   [] Obtained Test Results / Consult Notes        Complete if patient is withholding blood thinner for procedure     Blood Thinner Patient is currently taking:      [] Plavix (Hold for 7 days)  [] Aspirin (Hold for 5 days)     [] Pletal (Hold for 2 days)  [] Pradaxa (Hold for 3 days)    [] Effient (Hold for 7 days)  [] Xarelto (Hold for 2 days)    [] Eliquis (Hold for 2 days)  [] Brilinta (Hold for 7 days)    [] Coumadin (Hold for 5 days) - (INR needs to be drawn the day prior to procedure- INR < 2.0)    [] Aggrenox (Hold for 7 days)        [] Patient will stop medication on their own.    [] Blood Thinner Form Faxed for approval to hold.    Provider form faxed to:     Assessment Completed by:  Electronically signed by Mil Garza MA on 11/9/2022 at 9:50 AM

## 2022-11-09 NOTE — PROGRESS NOTES
[facility] Physical and Pain Medicine    Office Progress Note    Patient Name: Edin Pérez    MR #: [id number]    Account #: [de-identified]    : 1950    Age: 81 y.o. Sex: female    Date: 2022    PCP: Luis Eduardo Solorzano         Referring Provider:    Chief Complaint:   Chief Complaint   Patient presents with    Back Pain       History of Present Illness:    Edin Pérez is a 81 y.o. female who presents to the office for follow up LESI level L5-S1. She says that she obtained 6 weeks of relief at 80%. Says that she was able to increase her exercise after the injection. Is wanting to have the injection repeated. Does not want to go through the winter months in pain. Is having work up by Dr. Loretto Mortimer after the the first of the year to see if she has Parkinson's Disease. 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 Yes  [x]    No []   Did Physical Therapy make thepain better or worse? Better []   Worse []  Unchanged [x]     MRI in the last two years? Yes []  No [] [facility]  Results reviewed with patient? Yes [x]   No []    CT Scan in the last two years? Yes  []   No [x]  Results reviewed with patient? Yes []   No []     X-ray in the last two years? Yes []   No  [x]  Results reviewed with patient? Yes  []  No []     Injections in the past?  Yes []   No []   Did the injections help relieve the pain? Yes []   No []     Do you have Depression? Yes  []    No [x]  Thinking of harming yourself or others?   Yes  []   No [x]    Past Medical Histoy  Past Medical History:   Diagnosis Date    Dermatomycosis        Surgery History  Past Surgical History:   Procedure Laterality Date    FOOT SURGERY      GALLBLADDER SURGERY      HYSTERECTOMY, TOTAL ABDOMINAL (CERVIX REMOVED)      TUBAL LIGATION Allergies  Gabapentin, Methotrexate, and Codeine     Current Medications  Current Outpatient Medications   Medication Sig Dispense Refill    azaTHIOprine (IMURAN) 50 MG tablet Take 1 tablet by mouth daily      pregabalin (LYRICA) 300 MG capsule Take 1 capsule by mouth 2 times daily. vitamin B-12 (CYANOCOBALAMIN) 100 MCG tablet Take 50 mcg by mouth daily      magnesium 30 MG tablet Take 30 mg by mouth 2 times daily      levETIRAcetam (KEPPRA) 500 MG tablet TAKE 1 AND 1/2 TABLETS TWICE DAILY 270 tablet 5    acetaminophen (TYLENOL) 500 MG tablet Take 500 mg by mouth every 6 hours as needed for Pain      hydroCHLOROthiazide (HYDRODIURIL) 12.5 MG tablet Take 1 tablet by mouth 2 times daily      aspirin 81 MG chewable tablet Take 1 tablet by mouth daily      polyethylene glycol-propylene glycol (SYSTANE) 0.4-0.3 % GEL ophthalmic gel Systane (propylene glycol) 0.4 %-0.3 % eye drops   PRN      omeprazole (PRILOSEC) 20 MG delayed release capsule Take 1 capsule by mouth daily      Cholecalciferol (VITAMIN D3) 125 MCG (5000 UT) CAPS Take 1 capsule by mouth daily      leflunomide (ARAVA) 10 MG tablet Take 1 tablet by mouth daily       No current facility-administered medications for this encounter. Social History    Social History     Tobacco Use    Smoking status: Never    Smokeless tobacco: Never   Substance Use Topics    Alcohol use: Never         Family History  family history is not on file. Review of Systems:  Constitutional: denies fever, chills, fatigue, change in appetite, weight gain or weight loss  Head: Normocephalic  Skin: denies easy bruising, skin redness, skin rash, hives, sensitivity to sun exposure, tightness, nodules or bumps, hair loss, color changes in the hands or feet, or feeling of temperature change such as coldness  Eyes: denies pain, redness, loss of vision, double or blurred vision, eye drainage, or dryness.    ENT and Mouth: denies ringing in the ears, loss of hearing, nasal congestion, nasal discharge, no hoarseness, sore throat, or difficulty swallowing   Respiratory: denies chronic dry cough, coughing up blood, coughing up mucus, waking at night coughing or choking, or wheezing  Cardiovascular: denies chest pain, irregular heartbeats, palpitations, shortness of breath, or edema in legs  Gastrointestinal: denies, nausea, vomiting, heartburn, diarrhea, constipation  Genitourinary: denies difficult urination, pain or burning with urination, blood in the urine, or cloudy urine  Musculoskeletal: denies arm, buttock, thigh or calf cramps. Has pain in low back that radiates down right leg to toes. Tenderness in low back. No muscle weakness. No joint swelling. Neurologic: headache, dizziness, fainting, loss of consciousness, no sensitivity, no memory loss. .    Endocrine: denies intolerance to hot or cold temperature, night sweats, flushing, fingernail changes, increased thirst, or hairloss   Hematologic/ Lymphatic: denies anemia, bleeding tendency or clotting tendency, bruising easily. Allergic/ Immunologic: denies rhinitis, asthma, skin sensitivity, or Latex allergy  Psychiatric: denies depression or thoughts of suicide, or voices in head. Current Pain Assessment:   Pain Assessment  Pain Assessment: 0-10  Pain Level: 2  Pain Location: Back  Pain Type: Chronic pain    Clinical Progression: gradually improving  Effect of Pain on Daily Activities: limits activity  Patient's Stated Pain Goal: No pain  Pain Intervention(s): Medication (see eMar), Repositioning, Rest, Ice    Current PE.5    ORT Score: 10    PHQ-9 Score: 0    Physical Exam:    Vitals:    22 0954   BP: 108/68   Pulse: 70   Temp: 97 °F (36.1 °C)   TempSrc: Temporal   SpO2: 96%   Weight: 179 lb (81.2 kg)   Height: 5' 6\" (1.676 m)       Body mass index is 28.89 kg/m². General Appearance: No acute distress.  Appears to be well dressed  Skin Exam: Warm and dry, no jaundice, rashes or leasions  Head Exam: NCAT, PERRLA, EOMI, scalp normal  Eye Exam: PERRLA, EOMI, conjunctivae clear  Ear Exam: Normal external auricles. No drainage from ear canals  Nose Exam: Normal alignment. Turbinates clear. No drainage  Mouth Exam: Oral mucosa pink and moist. Gums pink. Throat Exam: Posterior pharynx pink in color with no edema  Neck Exam: Supple, trachea midline. No masses palpated. Respiratory Exam: Clear to ausculation in all lobes anterior and posterior. Cardiovascular Exam: Regular rate and rhythm, no gallops, no rubs or murmurs. No edema in extremities  Gastrointestinal Exam: Bowel sounds in all quadrants, soft, non-distended, non-tender with palpation, no guarding   Musculoskeletal Exam: No joint swelling or deformity. Back Exam: Positive straight leg raise right. Pain across low back  Hip Exam: Full rotation bilateral  Shoulder Exam: Full rotation bilateral  Knee Exam: Full flexion and extension bilateral  Extremities: No rash, cyanosis or bruising  Neurologic Exam: Gait and coordination normal, speech normal and clear  Reflexes: Normal brachialis, Negative Rodriges's bilateral. Normal Patellar bilateral,   CN EXAM: II-XII intact, face symmetrical, tongue symmetrical, the trapezius and sternocleidomastoid muscle appearance and strength symmetrical, normal achilles bilateral, ankle clonus negative bilateral  Strength: 5/5 RUE Bi's/Tri's, 5/5 LUE Bi's/Tri's, 5/5 RLE knee flex/ext, 5/5 RLE DF/PF, 5/5 LLE knee flex/ext, 5/5 LLE DF/PF  Sensation: Equal and intact to fine touch in all extremities  Mood and affect: Normal limits  Nurses note reviewed along with current vital signs    Active Problem(s)  Active Problems:    Lumbar radiculopathy  Resolved Problems:    * No resolved hospital problems. *                                                                                                                                 PLAN:  1.  Patient is to call the office with any questions or concerns that may arise prior to next appointment. 2. Schedule LESI level L5-S1      Patient take 81 mg ASA daily per self. Not prescribed by anyone. She will hold 5 days prior to procedure. Urine Drug Screen Today:   Yes  [x]    No  []     Discussion:  Discussed exam findings and plan of care with patient. Patient agreed with the current plan of care at this time. All questions from the patient were answered by the provider. Activity:   Discussed exercise as beneficial to pain reduction, encouraged stretching exercise with a focus on torso strengthening. Education Provided:  Review of Susi Shirts [x]  Agreement Review  [x]  Reviewed PHQ-9  [x]      Reviewed ORT [x]  Review of Test  [x]  Compliance Issues Discussed [x]   Cognitive Behavior Needs  []  Exercise  [x]  Financial Issues  []   Tobacco/Alcohol Use  []  Teaching  [x]   New Patient Picture Obtained  [x]      [] Benzodiazapine's and Narcotics:  Patient educated on the possible effects of combining Benzodiazapine's and Opioids. Explained \"Black Box Warnings\" such as; possible suppressed breathing, hypoxia, anoxia, depressed cognition, heart arrhythmia, coma and possible death. Patient verbalized understanding concerning possible effects. Controlled Substance Monitoring:   Attestation: The REGINO report for this patient was reviewed today. Discussed with patient possible medication side effects, risk of tolerance, dependence and alternative treatments. Discussed thegrowing epidemic in the [place] with the overprescribing and at times the abuse of narcotics. Discussed the detrimental effects of long term narcotic use. Patient encouraged to set daily goals of exercising and decreasing dailynarcotic intake. Discussed with the patient about the development of hyperalgesia with long term narcotic intake. EMR dragon/transcription disclaimer: Much of this encounter note is electronic transcription/translation of spoken language to printed tach.  Electronic translation of spoken language may be erroneous, or at times, nonsensical words or phrases may be inadvertently transcribed. Although, I have reviewed the note for such errors, some may still exist.    CC:  Thu Herr    Thank you for this kind referral and allowing me to participate    in your patients care.     1 [facility], [facility], 11/9/2022 at 10:11 AM

## 2022-12-21 ENCOUNTER — TELEPHONE (OUTPATIENT)
Dept: NEUROLOGY | Age: 72
End: 2022-12-21

## 2022-12-21 NOTE — TELEPHONE ENCOUNTER
Called patient back to give her a hospital  follow up appt. Patient stated that she was being d/c from the hospital today.  Scheduled her for 1-

## 2022-12-21 NOTE — TELEPHONE ENCOUNTER
Prema Worrell with [facility] called to schedule a  1 wk hfu . Pt was seen for seizures at [facility] in [facility]. Please call [phone] to schedule. Please be advised that the best time to call  [facility] to accommodate their needs is Anytime. Can speak to anyone that answers. Thank you.

## 2022-12-28 ENCOUNTER — TELEPHONE (OUTPATIENT)
Dept: PAIN MANAGEMENT | Age: 72
End: 2022-12-28

## 2022-12-28 NOTE — TELEPHONE ENCOUNTER
Call placed to patient to remind her to hold asa x5 days starting tomorrow for LESI next Tuesday. Patient did not answer the call and no voice mail is set up.

## 2023-01-30 ENCOUNTER — OFFICE VISIT (OUTPATIENT)
Dept: NEUROLOGY | Age: 73
End: 2023-01-30
Payer: MEDICARE

## 2023-01-30 VITALS
HEART RATE: 66 BPM | WEIGHT: 179 LBS | HEIGHT: 66 IN | SYSTOLIC BLOOD PRESSURE: 120 MMHG | DIASTOLIC BLOOD PRESSURE: 73 MMHG | BODY MASS INDEX: 28.77 KG/M2 | OXYGEN SATURATION: 96 %

## 2023-01-30 DIAGNOSIS — G40.909 SEIZURE DISORDER (HCC): Primary | ICD-10-CM

## 2023-01-30 DIAGNOSIS — G40.909 SEIZURE DISORDER (HCC): ICD-10-CM

## 2023-01-30 LAB
ALBUMIN SERPL-MCNC: 4.2 G/DL (ref 3.5–5.2)
ALP BLD-CCNC: 92 U/L (ref 35–104)
ALT SERPL-CCNC: 15 U/L (ref 5–33)
ANION GAP SERPL CALCULATED.3IONS-SCNC: 12 MMOL/L (ref 7–19)
AST SERPL-CCNC: 28 U/L (ref 5–32)
BASOPHILS ABSOLUTE: 0 K/UL (ref 0–0.2)
BASOPHILS RELATIVE PERCENT: 0.5 % (ref 0–1)
BILIRUB SERPL-MCNC: 0.5 MG/DL (ref 0.2–1.2)
BUN BLDV-MCNC: 19 MG/DL (ref 8–23)
CALCIUM SERPL-MCNC: 10.1 MG/DL (ref 8.8–10.2)
CHLORIDE BLD-SCNC: 102 MMOL/L (ref 98–111)
CO2: 26 MMOL/L (ref 22–29)
CREAT SERPL-MCNC: 0.8 MG/DL (ref 0.5–0.9)
EOSINOPHILS ABSOLUTE: 0.2 K/UL (ref 0–0.6)
EOSINOPHILS RELATIVE PERCENT: 4.2 % (ref 0–5)
GFR SERPL CREATININE-BSD FRML MDRD: >60 ML/MIN/{1.73_M2}
GLUCOSE BLD-MCNC: 93 MG/DL (ref 74–109)
HCT VFR BLD CALC: 40.3 % (ref 37–47)
HEMOGLOBIN: 13.1 G/DL (ref 12–16)
IMMATURE GRANULOCYTES #: 0 K/UL
LYMPHOCYTES ABSOLUTE: 0.7 K/UL (ref 1.1–4.5)
LYMPHOCYTES RELATIVE PERCENT: 17.4 % (ref 20–40)
MCH RBC QN AUTO: 31.4 PG (ref 27–31)
MCHC RBC AUTO-ENTMCNC: 32.5 G/DL (ref 33–37)
MCV RBC AUTO: 96.6 FL (ref 81–99)
MONOCYTES ABSOLUTE: 0.5 K/UL (ref 0–0.9)
MONOCYTES RELATIVE PERCENT: 12 % (ref 0–10)
NEUTROPHILS ABSOLUTE: 2.5 K/UL (ref 1.5–7.5)
NEUTROPHILS RELATIVE PERCENT: 65.6 % (ref 50–65)
PDW BLD-RTO: 14.4 % (ref 11.5–14.5)
PLATELET # BLD: 218 K/UL (ref 130–400)
PMV BLD AUTO: 11.6 FL (ref 9.4–12.3)
POTASSIUM SERPL-SCNC: 3.8 MMOL/L (ref 3.5–5)
RBC # BLD: 4.17 M/UL (ref 4.2–5.4)
SODIUM BLD-SCNC: 140 MMOL/L (ref 136–145)
TOTAL PROTEIN: 7.4 G/DL (ref 6.6–8.7)
WBC # BLD: 3.8 K/UL (ref 4.8–10.8)

## 2023-01-30 PROCEDURE — G8400 PT W/DXA NO RESULTS DOC: HCPCS | Performed by: NURSE PRACTITIONER

## 2023-01-30 PROCEDURE — 99214 OFFICE O/P EST MOD 30 MIN: CPT | Performed by: NURSE PRACTITIONER

## 2023-01-30 PROCEDURE — G8417 CALC BMI ABV UP PARAM F/U: HCPCS | Performed by: NURSE PRACTITIONER

## 2023-01-30 PROCEDURE — 3078F DIAST BP <80 MM HG: CPT | Performed by: NURSE PRACTITIONER

## 2023-01-30 PROCEDURE — 1123F ACP DISCUSS/DSCN MKR DOCD: CPT | Performed by: NURSE PRACTITIONER

## 2023-01-30 PROCEDURE — 3074F SYST BP LT 130 MM HG: CPT | Performed by: NURSE PRACTITIONER

## 2023-01-30 PROCEDURE — 3017F COLORECTAL CA SCREEN DOC REV: CPT | Performed by: NURSE PRACTITIONER

## 2023-01-30 PROCEDURE — 1036F TOBACCO NON-USER: CPT | Performed by: NURSE PRACTITIONER

## 2023-01-30 PROCEDURE — G8427 DOCREV CUR MEDS BY ELIG CLIN: HCPCS | Performed by: NURSE PRACTITIONER

## 2023-01-30 PROCEDURE — G8484 FLU IMMUNIZE NO ADMIN: HCPCS | Performed by: NURSE PRACTITIONER

## 2023-01-30 PROCEDURE — 1090F PRES/ABSN URINE INCON ASSESS: CPT | Performed by: NURSE PRACTITIONER

## 2023-01-30 RX ORDER — FOLIC ACID 1 MG/1
TABLET ORAL
COMMUNITY
Start: 2023-01-26

## 2023-01-30 NOTE — PROGRESS NOTES
REVIEW OF SYSTEMS    Constitutional: []Fever []Sweat []Chills [] Recent Injury [x] Denies all unless marked  HEENT:[]Headache  [] Head Injury/Hearing Loss  [] Sore Throat  [] Ear Ache/Dizziness  [x] Denies all unless marked  Spine:  [] Neck pain  [] Back pain  [] Sciaticia  [x] Denies all unless marked  Cardiovascular:[]Heart Disease []Chest Pain [] Palpitations  [x] Denies all unless marked  Pulmonary: []Shortness of Breath []Cough   [x] Denies all unless marke  Gastrointestinal: []Nausea  []Vomiting  []Abdominal Pain  []Constipation  []Diarrhea  []Dark Bloody Stools  [x] Denies all unless marked  Psychiatric/Behavioral:[] Depression [] Anxiety [x] Denies all unless marked  Genitourinary:   [] Frequency  [] Urgency  [] Incontinence [] Pain with Urination  [x] Denies all unless marked  Extremities: []Pain  []Swelling  [x] Denies all unless marked  Musculoskeletal: [] Muscle Pain  [] Joint Pain  [] Arthritis [] Muscle Cramps [] Muscle Twitches  [x] Denies all unless marked  Sleep: [] Insomnia [] Snoring [] Restless Legs [] Sleep Apnea  [] Daytime Sleepiness  [x] Denies all unless marked  Skin:[] Rash [] Skin Discoloration [x] Denies all unless marked   Neurological: []Visual Disturbance/Memory Loss [] Loss of Balance [] Slurred Speech/Weakness [x] Seizures  [] Vertigo/Dizziness [x] Denies all unless marked

## 2023-01-30 NOTE — PROGRESS NOTES
[facility] Neurology Office Note      Patient:   Sharla Rodriguez  MR#:    [id number]  Account Number:                         YOB: 1950  Date of Evaluation:  1/30/2023  Time of Note:                          8:33 PM  Primary/Referring Physician:  Douglas Arreaga   Consulting Physician:  ARMINDA Romo    FOLLOW UP    Chief Complaint   Patient presents with    Follow-up    Seizures     Last seizure 12/21/2022       HISTORY OF PRESENT ILLNESS    Sharla Rodriguez is a 81y.o. year old female here for seizure disorder. She reports having seizures at the end of December resulting in hospitalization in [facility]. She states she was taking care of her dog and does not remember what happened; woke up on the floor. Called family member and upon their arrival they called EMS. She reports that she had multiple seizures during this time. States she was given Ativan in the ER and stabilized. She states shortly after this she became febrile with weakness and falling, was readmitted for UTI and hypokalemia. She has completed Levaquin, is feeling better overall. No more seizure activity. She is on Keppra 750 mg BID still. No issues with this. No changes to seizure characteristics. Prior events described as feeling unusual, an atypical sensation, followed by a ALANNAH, possible GTC event, patient does not recall events. No tongue biting. No incontinence. No history of TBI, meningitis, or encephalitis. No family history of seizures. Prior EEG abnormal.      Past Medical History:   Diagnosis Date    Dermatomycosis     Hypokalemia        Past Surgical History:   Procedure Laterality Date    FOOT SURGERY      GALLBLADDER SURGERY      HYSTERECTOMY, TOTAL ABDOMINAL (CERVIX REMOVED)      TUBAL LIGATION         History reviewed. No pertinent family history.     Social History     Socioeconomic History    Marital status:      Spouse name: Not on file    Number of children: Not on file    Years of education: Not on file    Highest education level: Not on file   Occupational History    Not on file   Tobacco Use    Smoking status: Never    Smokeless tobacco: Never   Vaping Use    Vaping Use: Never used   Substance and Sexual Activity    Alcohol use: Never    Drug use: Never    Sexual activity: Yes     Partners: Male   Other Topics Concern    Not on file   Social History Narrative    Not on file     Social Determinants of Health     Financial Resource Strain: Not on file   Food Insecurity: Not on file   Transportation Needs: Not on file   Physical Activity: Not on file   Stress: Not on file   Social Connections: Not on file   Intimate Partner Violence: Not on file   Housing Stability: Not on file       Current Outpatient Medications   Medication Sig Dispense Refill    folic acid (FOLVITE) 1 MG tablet       azaTHIOprine (IMURAN) 50 MG tablet Take 1 tablet by mouth daily      pregabalin (LYRICA) 300 MG capsule Take 1 capsule by mouth 2 times daily.       vitamin B-12 (CYANOCOBALAMIN) 100 MCG tablet Take 50 mcg by mouth daily      levETIRAcetam (KEPPRA) 500 MG tablet TAKE 1 AND 1/2 TABLETS TWICE DAILY 270 tablet 5    acetaminophen (TYLENOL) 500 MG tablet Take 500 mg by mouth every 6 hours as needed for Pain      hydroCHLOROthiazide (HYDRODIURIL) 12.5 MG tablet Take 1 tablet by mouth 2 times daily      polyethylene glycol-propylene glycol (SYSTANE) 0.4-0.3 % GEL ophthalmic gel Systane (propylene glycol) 0.4 %-0.3 % eye drops   PRN      omeprazole (PRILOSEC) 20 MG delayed release capsule Take 1 capsule by mouth daily      Cholecalciferol (VITAMIN D3) 125 MCG (5000 UT) CAPS Take 1 capsule by mouth daily      leflunomide (ARAVA) 10 MG tablet Take 1 tablet by mouth daily      magnesium 30 MG tablet Take 30 mg by mouth 2 times daily (Patient not taking: Reported on 1/30/2023)      aspirin 81 MG chewable tablet Take 1 tablet by mouth daily (Patient not taking: Reported on 1/30/2023)       No current facility-administered medications for this visit. Allergies   Allergen Reactions    Gabapentin Other (See Comments)     Other reaction(s): Lightheadedness  \"Slept for a long time\"      Methotrexate Other (See Comments)    Codeine Nausea And Vomiting         REVIEW OF SYSTEMS  Constitutional: []Fever []Sweat []Chills [] Recent Injury [x] Denies all unless marked  HEENT:[]Headache  [] Head Injury/Hearing Loss  [] Sore Throat  [] Ear Ache/Dizziness  [x] Denies all unless marked  Spine:  [] Neck pain  [] Back pain  [] Sciaticia  [x] Denies all unless marked  Cardiovascular:[]Heart Disease []Chest Pain [] Palpitations  [x] Denies all unless marked  Pulmonary: []Shortness of Breath []Cough   [x] Denies all unless marke  Gastrointestinal: []Nausea  []Vomiting  []Abdominal Pain  []Constipation  []Diarrhea  []Dark Bloody Stools  [x] Denies all unless marked  Psychiatric/Behavioral:[] Depression [] Anxiety [x] Denies all unless marked  Genitourinary:   [] Frequency  [] Urgency  [] Incontinence [] Pain with Urination  [x] Denies all unless marked  Extremities: []Pain  []Swelling  [x] Denies all unless marked  Musculoskeletal: [] Muscle Pain  [] Joint Pain  [] Arthritis [] Muscle Cramps [] Muscle Twitches  [x] Denies all unless marked  Sleep: [] Insomnia [] Snoring [] Restless Legs [] Sleep Apnea  [] Daytime Sleepiness  [x] Denies all unless marked  Skin:[] Rash [] Skin Discoloration [x] Denies all unless marked   Neurological: []Visual Disturbance/Memory Loss [] Loss of Balance [] Slurred Speech/Weakness [x] Seizures  [] Vertigo/Dizziness [x] Denies all unless marked    The MA has completed the ROS with the patient. I have reviewed it in its' entirety with the patient and agree with the documentation.      PHYSICAL EXAM  /73   Pulse 66   Ht 5' 6\" (1.676 m)   Wt 179 lb (81.2 kg)   SpO2 96%   BMI 28.89 kg/m²       Constitutional - No acute distress    HEENT- Conjunctiva normal.  No scars, masses, or lesions over external nose or ears, no neck masses noted, no jugular vein distension, no bruit  Cardiac- Regular rate and rhythm  Pulmonary- Good expansion, normal effort without use of accessory muscles  Musculoskeletal - No significant wasting of muscles noted, no bony deformities  Extremities - No clubbing, cyanosis or edema  Skin - Warm, dry, and intact. No rash, erythema, or pallor  Psychiatric - Mood, affect, and behavior appear normal      NEUROLOGICAL EXAM     Mental status   [x] Awake, alert, oriented   [x]Affect attention and concentration appear appropriate  [x]Recent and remote memory appears unremarkable  [x]Speech normal without dysarthria or aphasia, comprehension and repetition intact. COMMENTS:    Cranial Nerves [x]No VF deficit to confrontation  [x]PERRLA, EOMI, no nystagmus, conjugate eye movements, no ptosis  [x]Face symmetric  [x]Facial sensation intact  [x]Tongue midline no atrophy or fasciculations present  [x]Palate midline, hearing to finger rub normal bilaterally  [x]Shoulder shrug and SCM testing normal bilaterally  COMMENTS:   Motor   [x]5/5 strength x 4 extremities  [x]Normal bulk and tone  []No tremor present  [x]No rigidity or bradykinesia noted  COMMENTS:mild intention tremor BUE   Sensory  []Sensation intact to light touch, pin prick, vibration, and proprioception BLE  []Sensation intact to light touch, pin prick, vibration, and proprioception BUE  COMMENTS:   Coordination [x]FTN normal bilaterally   []HTS normal bilaterally  []CHESTER normal bilaterally.    COMMENTS:   Reflexes  [x]Symmetric and non-pathological  [x]Toes down going bilaterally  [x]No clonus present  COMMENTS:   Gait                  [x]Normal steady gait    []Ataxic    []Spastic     []Magnetic     []Shuffling  COMMENTS:Cautious, decreased arm swing       LABS RECORD AND IMAGING REVIEW (As below and per HPI)    No results found for: [id number]  Lab Results   Component Value Date    WBC 3.8 (L) 01/30/2023    HGB 13.1 01/30/2023    HCT 40.3 01/30/2023    MCV 96.6 2023     2023     Lab Results   Component Value Date     2023    K 3.8 2023     2023    CO2 26 2023    BUN 19 2023    CREATININE 0.8 2023    GLUCOSE 93 2023    CALCIUM 10.1 2023    PROT 7.4 2023    LABALBU 4.2 2023    BILITOT 0.5 2023    ALKPHOS 92 2023    AST 28 2023    ALT 15 2023    LABGLOM >60 2023    GFRAA 49 (A) 2021     No results found for: CHOL, TRIG, HDL, LDLCALC  No results found for: TSH, T4FREE  Lab Results   Component Value Date    CRP 0.38 2019    SEDRATE 23 2019        Records reviewed.      EEG - intermittent left parasagittal spike discharges.      MRI - largely negative.     NCS/EMG BLE c/w polyneuropathy.     ASSESSMENT:    Carolina Hoskins is a 72 y.o. year old female here for hospital follow up. She is doing better overall. She was admitted to [facility] due to seizure activity that was controlled with Ativan. She was found to have hypokalemia and UTI as well. Levaquin has been completed, she has not had anymore seizure-like activity. She has no complaints today. Previous MRI brain  notes . EEG 10/15/2020 was abnormal. Will continue on current dose of Keppra- I do not feel that it needs to be titrated due to seizures being correlated with infectious process. She is usually well-controlled. Will get lab work today to re-evaluate hypokalemia and check Keppra level. Will repeat EEG as well since last one was in .       ICD-10-CM    1. Seizure disorder (HCC)  G40.909 Levetiracetam Level     CBC with Auto Differential     Comprehensive Metabolic Panel     EEG awake and asleep        PLAN:  Labs as listed today.  Continue Keppra 750 mg bid.   EEG awake and asleep.  Continue Home Health as scheduled.   Records requested from [facility].   Epilepsy precautions and seizure first aid discussed.  Driving per [place] state law.  No heights,  swimming, tub baths, open flames, or heavy machinery. 7. Follow up with Dr. Luly Arias as scheduled, sooner if worsening.      ARMINDA Romo

## 2023-02-02 LAB — KEPPRA: 46 UG/ML (ref 10–40)

## 2023-02-14 ENCOUNTER — HOSPITAL ENCOUNTER (OUTPATIENT)
Dept: PAIN MANAGEMENT | Age: 73
Discharge: HOME | End: 2023-02-14
Payer: MEDICARE

## 2023-02-14 VITALS
SYSTOLIC BLOOD PRESSURE: 158 MMHG | RESPIRATION RATE: 16 BRPM | HEART RATE: 66 BPM | TEMPERATURE: 96.1 F | DIASTOLIC BLOOD PRESSURE: 67 MMHG | OXYGEN SATURATION: 92 %

## 2023-02-14 DIAGNOSIS — R52 PAIN MANAGEMENT: ICD-10-CM

## 2023-02-14 PROCEDURE — 62323 NJX INTERLAMINAR LMBR/SAC: CPT

## 2023-02-14 PROCEDURE — 6360000002 HC RX W HCPCS

## 2023-02-14 PROCEDURE — 2580000003 HC RX 258

## 2023-02-14 PROCEDURE — 2500000003 HC RX 250 WO HCPCS

## 2023-02-14 PROCEDURE — A4216 STERILE WATER/SALINE, 10 ML: HCPCS

## 2023-02-14 RX ORDER — LIDOCAINE HYDROCHLORIDE 10 MG/ML
5 INJECTION, SOLUTION EPIDURAL; INFILTRATION; INTRACAUDAL; PERINEURAL ONCE
Status: DISCONTINUED | OUTPATIENT
Start: 2023-02-14 | End: 2023-02-16 | Stop reason: HOSPADM

## 2023-02-14 RX ORDER — METHYLPREDNISOLONE ACETATE 80 MG/ML
80 INJECTION, SUSPENSION INTRA-ARTICULAR; INTRALESIONAL; INTRAMUSCULAR; SOFT TISSUE ONCE
Status: DISCONTINUED | OUTPATIENT
Start: 2023-02-14 | End: 2023-02-16 | Stop reason: HOSPADM

## 2023-02-14 RX ORDER — SODIUM CHLORIDE 9 MG/ML
5 INJECTION INTRAVENOUS ONCE
Status: DISCONTINUED | OUTPATIENT
Start: 2023-02-14 | End: 2023-02-16 | Stop reason: HOSPADM

## 2023-02-14 ASSESSMENT — PAIN DESCRIPTION - LOCATION: LOCATION: BACK;LEG

## 2023-02-14 ASSESSMENT — PAIN - FUNCTIONAL ASSESSMENT
PAIN_FUNCTIONAL_ASSESSMENT: NONE - DENIES PAIN
PAIN_FUNCTIONAL_ASSESSMENT: PREVENTS OR INTERFERES SOME ACTIVE ACTIVITIES AND ADLS

## 2023-02-14 ASSESSMENT — PAIN DESCRIPTION - DESCRIPTORS: DESCRIPTORS: PRESSURE;ACHING

## 2023-02-14 ASSESSMENT — PAIN DESCRIPTION - ORIENTATION: ORIENTATION: RIGHT;LOWER

## 2023-02-14 ASSESSMENT — PAIN SCALES - GENERAL: PAINLEVEL_OUTOF10: 3

## 2023-02-14 NOTE — PROGRESS NOTES
Procedure:  Level of Consciousness: [x]Alert [x]Oriented []Disoriented []Lethargic  Anxiety Level: [x]Calm []Anxious []Depressed []Other  Skin: [x]Warm [x]Dry []Cool []Moist []Intact []Other  Cardiovascular: [x]Palpitations: [x]Never []Occasionally []Frequently  Chest Pain: [x]No []Yes  Respiratory:  [x]Unlabored []Labored []Cough ([] Productive []Unproductive)  HCG Required: [x]No []Yes   Results: []Negative []Positive  Knowledge Level:        [x]Patient/Other verbalized understanding of pre-procedure instructions.        [x]Assessment of post-op care needs (transportation, responsible caregiver)        [x]Able to discuss health care problems and how to deal with it.  Factors that Affect Teaching:        Language Barrier: [x]No []Yes - why:        Hearing Loss:        [x]No []Yes            Corrective Device:  []Yes [x]No        Vision Loss:           []No [x]Yes            Corrective Device:  [x]Yes []No        Memory Loss:       [x]No []Yes            []Short Term []Long Term  Motivational Level:  [x]Asks Questions                  []Extremely Anxious       [x]Seems Interested               []Seems Uninterested                  []Denies need for Education  Risk for Injury:  [x]Patient oriented to person, place and time  []History of frequent falls/loss of balance  Nutritional:  []Change in appetite   []Weight Gain   []Weight Loss  Functional:  []Requires assistance with ADL's

## 2023-02-14 NOTE — INTERVAL H&P NOTE
Update History & Physical    The patient's History and Physical  was reviewed with the patient and I examined the patient. There was no change. The surgical site was confirmed by the patient and me. Plan: The risks, benefits, expected outcome, and alternative to the recommended procedure have been discussed with the patient. Patient understands and wants to proceed with the procedure.      Electronically signed by Rayo Altman MD on 2/14/2023 at 11:08 AM

## 2023-02-20 ENCOUNTER — TELEPHONE (OUTPATIENT)
Dept: PAIN MANAGEMENT | Age: 73
End: 2023-02-20

## 2023-03-03 ENCOUNTER — HOSPITAL ENCOUNTER (OUTPATIENT)
Dept: NEUROLOGY | Age: 73
Discharge: HOME | End: 2023-03-03
Payer: MEDICARE

## 2023-03-03 DIAGNOSIS — G40.909 SEIZURE DISORDER (HCC): ICD-10-CM

## 2023-03-03 PROCEDURE — 95813 EEG EXTND MNTR 61-119 MIN: CPT | Performed by: PSYCHIATRY & NEUROLOGY

## 2023-03-03 PROCEDURE — 95813 EEG EXTND MNTR 61-119 MIN: CPT

## 2023-03-03 NOTE — PROCEDURES
ADULT OUTPATIENT ELECTROENCEPHALOGRAM REPORT    Patient:   Stanley Hanson  MR#:    [id number]  YOB: 1950  Date of Evaluation:  3/3/2023  Primary Physician:     Alexey Morales MD  Referring Physician:   ARMINDA Schwab      CLINICAL INFORMATION:     This patient is a 81 y.o. female with a history of seizures. MEDICATIONS:     See MAR. RECORDING CONDITIONS:     This EEG was performed utilizing standard International 10-20 System of electrode placement, with additional channels monitored for eye movement. One channel electrocardiogram was monitored. Data was obtained, stored, and interpreted according to ACNS guidelines (J Clin Neurophysiol 2006;23(2):) utilizing referential montage recording, with reformatting to longitudinal, transverse bipolar, and referential montages as necessary for interpretation, along with the digital/automated EEG analysis. Patient tolerated entire procedure well. Photic stimulation and hyperventilation were utilized as activation procedures unless otherwise specified below. Recording time was 61 minutes. E.E.G. DESCRIPTION:     The resting predominant posterior background frequency is a 9-10 Hz 30-40 uV rhythm. Intermittent frontally predominant spike and wave discharges were seen throughout the recording appearing asynchronous at times. Drowsiness and sleep were not demonstrated. Hyperventilation was not performed. Photic stimulation was performed and had little change on the recording. Muscle, motion, and eye movement artifacts were noted. EEG INTERPRETATION:    Abnormal EEG due to intermittent frontally predominant spike and wave discharges appearing asynchronous at times. Suspect a frontal lobe seizure focus though primary generalized epilepsy is not excluded. Clinical correlation is needed.            Susan Hopson DO  Board Certified Neurologist      Date reported: 3/3/2023  Date signed: 3/3/2023

## 2023-03-06 ENCOUNTER — TELEPHONE (OUTPATIENT)
Dept: NEUROLOGY | Age: 73
End: 2023-03-06

## 2023-03-06 NOTE — TELEPHONE ENCOUNTER
Called and spoke with patient advised her of BW result note seen below. Patient replied with \"ok\" and hung up the phone.

## 2023-03-06 NOTE — TELEPHONE ENCOUNTER
----- Message from ARMINDA Angel CNP sent at 3/3/2023 12:31 PM CST -----  EEG abnormal, there is known seizure disorder. Continue Keppra as prescribed. Keep follow-up with Dr. Amarjit Presley as scheduled. If there is more seizure activity please contact our office. Seizure precautions.

## 2023-05-03 ENCOUNTER — OFFICE VISIT (OUTPATIENT)
Dept: NEUROLOGY | Age: 73
End: 2023-05-03
Payer: MEDICARE

## 2023-05-03 VITALS
HEART RATE: 72 BPM | HEIGHT: 66 IN | OXYGEN SATURATION: 96 % | BODY MASS INDEX: 28.77 KG/M2 | WEIGHT: 179 LBS | SYSTOLIC BLOOD PRESSURE: 118 MMHG | DIASTOLIC BLOOD PRESSURE: 66 MMHG

## 2023-05-03 DIAGNOSIS — G89.29 CHRONIC BILATERAL LOW BACK PAIN WITHOUT SCIATICA: ICD-10-CM

## 2023-05-03 DIAGNOSIS — G62.9 POLYNEUROPATHY: ICD-10-CM

## 2023-05-03 DIAGNOSIS — G40.909 SEIZURE DISORDER (HCC): Primary | ICD-10-CM

## 2023-05-03 DIAGNOSIS — R42 VERTIGO: ICD-10-CM

## 2023-05-03 DIAGNOSIS — M54.50 CHRONIC BILATERAL LOW BACK PAIN WITHOUT SCIATICA: ICD-10-CM

## 2023-05-03 PROBLEM — F41.9 ANXIETY: Status: ACTIVE | Noted: 2023-03-27

## 2023-05-03 PROBLEM — N30.90 RECURRENT CYSTITIS: Status: ACTIVE | Noted: 2023-03-27

## 2023-05-03 PROBLEM — M19.90 CHRONIC OSTEOARTHRITIS: Status: ACTIVE | Noted: 2022-11-21

## 2023-05-03 PROBLEM — G20.A1 PARKINSON DISEASE: Status: ACTIVE | Noted: 2022-11-21

## 2023-05-03 PROBLEM — I95.9 HYPOTENSION: Status: ACTIVE | Noted: 2022-12-22

## 2023-05-03 PROBLEM — G20 PARKINSON DISEASE (HCC): Status: ACTIVE | Noted: 2022-11-21

## 2023-05-03 PROBLEM — R30.0 DYSURIA: Status: ACTIVE | Noted: 2023-03-27

## 2023-05-03 PROCEDURE — G8427 DOCREV CUR MEDS BY ELIG CLIN: HCPCS | Performed by: PSYCHIATRY & NEUROLOGY

## 2023-05-03 PROCEDURE — 1123F ACP DISCUSS/DSCN MKR DOCD: CPT | Performed by: PSYCHIATRY & NEUROLOGY

## 2023-05-03 PROCEDURE — 3017F COLORECTAL CA SCREEN DOC REV: CPT | Performed by: PSYCHIATRY & NEUROLOGY

## 2023-05-03 PROCEDURE — 1090F PRES/ABSN URINE INCON ASSESS: CPT | Performed by: PSYCHIATRY & NEUROLOGY

## 2023-05-03 PROCEDURE — 3074F SYST BP LT 130 MM HG: CPT | Performed by: PSYCHIATRY & NEUROLOGY

## 2023-05-03 PROCEDURE — 3078F DIAST BP <80 MM HG: CPT | Performed by: PSYCHIATRY & NEUROLOGY

## 2023-05-03 PROCEDURE — 99214 OFFICE O/P EST MOD 30 MIN: CPT | Performed by: PSYCHIATRY & NEUROLOGY

## 2023-05-03 PROCEDURE — G8417 CALC BMI ABV UP PARAM F/U: HCPCS | Performed by: PSYCHIATRY & NEUROLOGY

## 2023-05-03 PROCEDURE — G8400 PT W/DXA NO RESULTS DOC: HCPCS | Performed by: PSYCHIATRY & NEUROLOGY

## 2023-05-03 PROCEDURE — 1036F TOBACCO NON-USER: CPT | Performed by: PSYCHIATRY & NEUROLOGY

## 2023-05-03 RX ORDER — PROMETHAZINE HYDROCHLORIDE 25 MG/1
1 TABLET ORAL PRN
COMMUNITY
Start: 2023-04-17

## 2023-05-03 RX ORDER — LEVETIRACETAM 500 MG/1
TABLET ORAL
Qty: 360 TABLET | Refills: 3 | Status: SHIPPED | OUTPATIENT
Start: 2023-05-03

## 2023-05-03 NOTE — PROGRESS NOTES
[facility] Neurology Office Note      Patient:   Paulo Forman  MR#:    [id number]  Account Number:                         YOB: 1950  Date of Evaluation:  5/3/2023  Time of Note:                          11:22 AM  Primary/Referring Physician:  Deep Vasquez MD   Consulting Physician:  Sunita Segal DO    FOLLOW UP VISIT      Chief Complaint   Patient presents with    6 Month Follow-Up    Seizures     Last seizure was Dec 2022       HISTORY OF PRESENT ILLNESS    Paulo Forman is a 81y.o. year old female here for possible seizures. Patient had an event back in December, associated with UTI, was admitted for observation and discharged the next day. Prior events described as feeling unusual, an atypical sensation, followed by a ALANNAH, possible GTC event, patient does not recall the events. No tongue biting. No incontinence. Vertigo noted as well, unchanged. Some possible orthostatic symptoms as well. No chest pain or palpitations. No history of TBI, meningitis, or encephalitis. No family history of seizures. She has had a cardiac monitor. She was hospitalized in [place], [place]. No facial drooping, slurred speech, no focal weakness or numbness. No prior stroke history. No prior seizure history. Cardiology is following. CD, ECHO previously completed and was largely negative. CT head negative per patient. She is not on seizure medication currently. Dermatomyositis history noted. Followed by Dr. Lay Flores, was on MTX and has been stopped since the episode. Blood pressure medications adjusted for possible orthostatic symptoms, but still noted. EEG abnormal, on Keppra. No overt side effects. MRI with nothing acute. Back pain noted, neurosurgery is following. Noting some possible right upper extremity numbness intermittently as well, unchanged. Prior repeat MRI negative.          Past Medical History:   Diagnosis Date    Dermatomycosis     Hypokalemia        Past Surgical History:

## 2023-05-15 ENCOUNTER — OFFICE VISIT (OUTPATIENT)
Dept: UROLOGY | Age: 73
End: 2023-05-15

## 2023-05-15 VITALS — HEIGHT: 66 IN | WEIGHT: 173.6 LBS | TEMPERATURE: 97.2 F | BODY MASS INDEX: 27.9 KG/M2

## 2023-05-15 DIAGNOSIS — N32.81 OAB (OVERACTIVE BLADDER): Primary | ICD-10-CM

## 2023-05-15 DIAGNOSIS — R32 ENURESIS: ICD-10-CM

## 2023-05-15 DIAGNOSIS — R30.0 DYSURIA: ICD-10-CM

## 2023-05-15 DIAGNOSIS — N39.41 URGE INCONTINENCE: ICD-10-CM

## 2023-05-15 LAB
BACTERIA URINE, POC: 0
BILIRUBIN URINE: 2 MG/DL
BLOOD, URINE: NEGATIVE
CASTS URINE, POC: 0
CLARITY: CLEAR
COLOR: YELLOW
CRYSTALS URINE, POC: 0
EPI CELLS URINE, POC: NORMAL
GLUCOSE URINE: NORMAL
KETONES, URINE: POSITIVE
LEUKOCYTE EST, POC: NORMAL
NITRITE, URINE: NEGATIVE
PH UA: 5.5 (ref 4.5–8)
POST VOID RESIDUAL (PVR): 51 ML
PROTEIN UA: NEGATIVE
RBC URINE, POC: 0
SPECIFIC GRAVITY UA: 1.02 (ref 1–1.03)
UROBILINOGEN, URINE: NORMAL
WBC URINE, POC: 0
YEAST URINE, POC: 0

## 2023-05-15 RX ORDER — ESTRADIOL 0.1 MG/G
1 CREAM VAGINAL
Qty: 1 EACH | Refills: 3 | Status: SHIPPED | OUTPATIENT
Start: 2023-05-15

## 2023-05-15 RX ORDER — OXYBUTYNIN CHLORIDE 10 MG/1
10 TABLET, EXTENDED RELEASE ORAL DAILY
Qty: 30 TABLET | Refills: 2 | Status: SHIPPED | OUTPATIENT
Start: 2023-05-15

## 2023-05-15 NOTE — PROGRESS NOTES
George Sotomayor is a 81 y.o. female who presents today   Chief Complaint   Patient presents with    New Patient     I am here today for cystitis        Urinary Tract Infection:  Patient is here today for evaluation of recurrent UTI  The patient has had 1 infections the past year. During the infections, the patient has experienced the following symptoms: dysuria, frequency, urgency  Fever and chills? no fever or chills  Previous urine C&S results:   4/4/23- UA positive nitrite, trace leuks   4/17/23 Neg urine culture     Previous treatments tried for UTI's: Antibiotics. Symptoms improve with treatment: no-    Lower urinary tract symptoms: urgency, frequency, enuresis, and urine incontinence:  urge  Wears 3-4 pads per day. Has been leaking around 2 years. History of hysterectomy due to fibroid tumors. No personal or family history of breast cancer up-to-date on her mammogram.  Denies any constipation or history glaucoma. Started in seizures December 2022. They reported her seizures were from her UTIs although she is really been positive for UTI. She is afraid that she is going to have seizures any moment was to be treated for UTIs. She reports heaviness in her suprapubic area, frequency, incomplete emptying, dysuria. 25-pack-year history of smoking.     UA 12/20/22 neg   UA 1/4/23- UA +3, #+, epi cells   UA neg     Past Medical History:   Diagnosis Date    Dermatomycosis     Hypokalemia        Past Surgical History:   Procedure Laterality Date    FOOT SURGERY      GALLBLADDER SURGERY      HYSTERECTOMY, TOTAL ABDOMINAL (CERVIX REMOVED)      TUBAL LIGATION         Current Outpatient Medications   Medication Sig Dispense Refill    oxybutynin (DITROPAN XL) 10 MG extended release tablet Take 1 tablet by mouth daily 30 tablet 2    estradiol (ESTRACE VAGINAL) 0.1 MG/GM vaginal cream Place 1 g vaginally Twice a Week Apply pea sized amount to tip of finger and apply to urethra twice a week 1 each 3

## 2023-05-21 ASSESSMENT — ENCOUNTER SYMPTOMS
VOMITING: 0
ABDOMINAL DISTENTION: 0
BACK PAIN: 0
NAUSEA: 0
ABDOMINAL PAIN: 0

## 2023-06-02 ENCOUNTER — HOSPITAL ENCOUNTER (INPATIENT)
Age: 73
LOS: 6 days | Discharge: ANOTHER HOSPITAL | DRG: 808 | End: 2023-06-08
Attending: INTERNAL MEDICINE | Admitting: HOSPITALIST
Payer: MEDICARE

## 2023-06-02 PROBLEM — R17 ELEVATED BILIRUBIN: Status: ACTIVE | Noted: 2023-06-02

## 2023-06-02 LAB
BASOPHILS # BLD: 0 K/UL (ref 0–0.2)
BASOPHILS NFR BLD: 0.5 % (ref 0–1)
EOSINOPHIL # BLD: 0.1 K/UL (ref 0–0.6)
EOSINOPHIL NFR BLD: 1.4 % (ref 0–5)
ERYTHROCYTE [DISTWIDTH] IN BLOOD BY AUTOMATED COUNT: 18.4 % (ref 11.5–14.5)
HCT VFR BLD AUTO: 26.8 % (ref 37–47)
HGB BLD-MCNC: 9.6 G/DL (ref 12–16)
IMM GRANULOCYTES # BLD: 0 K/UL
LYMPHOCYTES # BLD: 0.7 K/UL (ref 1.1–4.5)
LYMPHOCYTES NFR BLD: 15.8 % (ref 20–40)
MCH RBC QN AUTO: 40.9 PG (ref 27–31)
MCHC RBC AUTO-ENTMCNC: 35.8 G/DL (ref 33–37)
MCV RBC AUTO: 114 FL (ref 81–99)
MONOCYTES # BLD: 0 K/UL (ref 0–0.9)
MONOCYTES NFR BLD: 0.7 % (ref 0–10)
NEUTROPHILS # BLD: 3.4 K/UL (ref 1.5–7.5)
NEUTS SEG NFR BLD: 81.1 % (ref 50–65)
PLATELET # BLD AUTO: 98 K/UL (ref 130–400)
PMV BLD AUTO: 12.7 FL (ref 9.4–12.3)
RBC # BLD AUTO: 2.35 M/UL (ref 4.2–5.4)
WBC # BLD AUTO: 4.2 K/UL (ref 4.8–10.8)

## 2023-06-02 PROCEDURE — 1210000000 HC MED SURG R&B

## 2023-06-02 RX ORDER — OXYBUTYNIN CHLORIDE 5 MG/1
10 TABLET, EXTENDED RELEASE ORAL DAILY
Status: DISCONTINUED | OUTPATIENT
Start: 2023-06-03 | End: 2023-06-08 | Stop reason: HOSPADM

## 2023-06-02 RX ORDER — SODIUM CHLORIDE, SODIUM LACTATE, POTASSIUM CHLORIDE, CALCIUM CHLORIDE 600; 310; 30; 20 MG/100ML; MG/100ML; MG/100ML; MG/100ML
INJECTION, SOLUTION INTRAVENOUS CONTINUOUS
Status: DISCONTINUED | OUTPATIENT
Start: 2023-06-02 | End: 2023-06-08

## 2023-06-02 RX ORDER — PANTOPRAZOLE SODIUM 20 MG/1
20 TABLET, DELAYED RELEASE ORAL
Status: DISCONTINUED | OUTPATIENT
Start: 2023-06-03 | End: 2023-06-08 | Stop reason: HOSPADM

## 2023-06-02 RX ORDER — ESTRADIOL 0.1 MG/G
1 CREAM VAGINAL
Status: DISCONTINUED | OUTPATIENT
Start: 2023-06-05 | End: 2023-06-08

## 2023-06-02 RX ORDER — LEFLUNOMIDE 20 MG/1
10 TABLET ORAL DAILY
Status: DISCONTINUED | OUTPATIENT
Start: 2023-06-03 | End: 2023-06-08

## 2023-06-02 RX ORDER — SODIUM CHLORIDE 0.9 % (FLUSH) 0.9 %
5-40 SYRINGE (ML) INJECTION PRN
Status: DISCONTINUED | OUTPATIENT
Start: 2023-06-02 | End: 2023-06-08 | Stop reason: HOSPADM

## 2023-06-02 RX ORDER — ONDANSETRON 4 MG/1
4 TABLET, ORALLY DISINTEGRATING ORAL EVERY 8 HOURS PRN
Status: DISCONTINUED | OUTPATIENT
Start: 2023-06-02 | End: 2023-06-08 | Stop reason: HOSPADM

## 2023-06-02 RX ORDER — LEVETIRACETAM 500 MG/1
1000 TABLET ORAL 2 TIMES DAILY
Status: DISCONTINUED | OUTPATIENT
Start: 2023-06-02 | End: 2023-06-08

## 2023-06-02 RX ORDER — SODIUM CHLORIDE 0.9 % (FLUSH) 0.9 %
5-40 SYRINGE (ML) INJECTION EVERY 12 HOURS SCHEDULED
Status: DISCONTINUED | OUTPATIENT
Start: 2023-06-02 | End: 2023-06-08 | Stop reason: HOSPADM

## 2023-06-02 RX ORDER — PROMETHAZINE HYDROCHLORIDE 25 MG/1
25 TABLET ORAL EVERY 6 HOURS PRN
Status: DISCONTINUED | OUTPATIENT
Start: 2023-06-02 | End: 2023-06-08

## 2023-06-02 RX ORDER — PANTOPRAZOLE SODIUM 40 MG/10ML
80 INJECTION, POWDER, LYOPHILIZED, FOR SOLUTION INTRAVENOUS ONCE
Status: COMPLETED | OUTPATIENT
Start: 2023-06-03 | End: 2023-06-03

## 2023-06-02 RX ORDER — UBIDECARENONE 75 MG
50 CAPSULE ORAL DAILY
Status: DISCONTINUED | OUTPATIENT
Start: 2023-06-03 | End: 2023-06-08 | Stop reason: HOSPADM

## 2023-06-02 RX ORDER — POLYVINYL ALCOHOL 14 MG/ML
1 SOLUTION/ DROPS OPHTHALMIC 4 TIMES DAILY
Status: DISCONTINUED | OUTPATIENT
Start: 2023-06-03 | End: 2023-06-08 | Stop reason: HOSPADM

## 2023-06-02 RX ORDER — SODIUM CHLORIDE 9 MG/ML
INJECTION, SOLUTION INTRAVENOUS PRN
Status: DISCONTINUED | OUTPATIENT
Start: 2023-06-02 | End: 2023-06-08 | Stop reason: HOSPADM

## 2023-06-02 RX ORDER — ENOXAPARIN SODIUM 100 MG/ML
40 INJECTION SUBCUTANEOUS DAILY
Status: DISCONTINUED | OUTPATIENT
Start: 2023-06-03 | End: 2023-06-02

## 2023-06-02 RX ORDER — AZATHIOPRINE 50 MG/1
50 TABLET ORAL DAILY
Status: DISCONTINUED | OUTPATIENT
Start: 2023-06-03 | End: 2023-06-04

## 2023-06-02 RX ORDER — PREGABALIN 150 MG/1
600 CAPSULE ORAL 2 TIMES DAILY
Status: DISCONTINUED | OUTPATIENT
Start: 2023-06-02 | End: 2023-06-08 | Stop reason: HOSPADM

## 2023-06-02 RX ORDER — FOLIC ACID 1 MG/1
1 TABLET ORAL 2 TIMES DAILY
Status: DISCONTINUED | OUTPATIENT
Start: 2023-06-02 | End: 2023-06-08 | Stop reason: HOSPADM

## 2023-06-02 RX ORDER — ONDANSETRON 2 MG/ML
4 INJECTION INTRAMUSCULAR; INTRAVENOUS EVERY 6 HOURS PRN
Status: DISCONTINUED | OUTPATIENT
Start: 2023-06-02 | End: 2023-06-08 | Stop reason: HOSPADM

## 2023-06-02 RX ORDER — MECOBALAMIN 5000 MCG
5 TABLET,DISINTEGRATING ORAL NIGHTLY PRN
Status: DISCONTINUED | OUTPATIENT
Start: 2023-06-02 | End: 2023-06-08 | Stop reason: HOSPADM

## 2023-06-02 RX ORDER — POLYETHYLENE GLYCOL 3350 17 G/17G
17 POWDER, FOR SOLUTION ORAL DAILY PRN
Status: DISCONTINUED | OUTPATIENT
Start: 2023-06-02 | End: 2023-06-08 | Stop reason: HOSPADM

## 2023-06-02 RX ORDER — CALCIUM CARBONATE 500 MG/1
500 TABLET, CHEWABLE ORAL 3 TIMES DAILY PRN
Status: DISCONTINUED | OUTPATIENT
Start: 2023-06-02 | End: 2023-06-08 | Stop reason: HOSPADM

## 2023-06-02 RX ORDER — HYDROCHLOROTHIAZIDE 25 MG/1
12.5 TABLET ORAL 2 TIMES DAILY
Status: DISCONTINUED | OUTPATIENT
Start: 2023-06-03 | End: 2023-06-08 | Stop reason: HOSPADM

## 2023-06-03 ENCOUNTER — APPOINTMENT (OUTPATIENT)
Dept: ULTRASOUND IMAGING | Age: 73
DRG: 808 | End: 2023-06-03
Attending: INTERNAL MEDICINE
Payer: MEDICARE

## 2023-06-03 LAB
ALBUMIN SERPL-MCNC: 2.6 G/DL (ref 3.5–5.2)
ALP SERPL-CCNC: 134 U/L (ref 35–104)
ALT SERPL-CCNC: 37 U/L (ref 5–33)
ANION GAP SERPL CALCULATED.3IONS-SCNC: 11 MMOL/L (ref 7–19)
ANION GAP SERPL CALCULATED.3IONS-SCNC: 12 MMOL/L (ref 7–19)
ANION GAP SERPL CALCULATED.3IONS-SCNC: 13 MMOL/L (ref 7–19)
AST SERPL-CCNC: 55 U/L (ref 5–32)
BASOPHILS # BLD: 0 K/UL (ref 0–0.2)
BASOPHILS NFR BLD: 0 % (ref 0–1)
BILIRUB DIRECT SERPL-MCNC: >20 MG/DL (ref 0–0.3)
BILIRUB INDIRECT SERPL-MCNC: 13.6 MG/DL (ref 0.1–1)
BILIRUB SERPL-MCNC: 30.3 MG/DL (ref 0.2–1.2)
BILIRUB SERPL-MCNC: 33.6 MG/DL (ref 0.2–1.2)
BUN SERPL-MCNC: 25 MG/DL (ref 8–23)
BUN SERPL-MCNC: 26 MG/DL (ref 8–23)
BUN SERPL-MCNC: 27 MG/DL (ref 8–23)
CALCIUM SERPL-MCNC: 8.3 MG/DL (ref 8.8–10.2)
CALCIUM SERPL-MCNC: 9.1 MG/DL (ref 8.8–10.2)
CALCIUM SERPL-MCNC: 9.6 MG/DL (ref 8.8–10.2)
CHLORIDE SERPL-SCNC: 100 MMOL/L (ref 98–111)
CHLORIDE SERPL-SCNC: 102 MMOL/L (ref 98–111)
CHLORIDE SERPL-SCNC: 99 MMOL/L (ref 98–111)
CK SERPL-CCNC: 166 U/L (ref 26–192)
CO2 SERPL-SCNC: 26 MMOL/L (ref 22–29)
CO2 SERPL-SCNC: 26 MMOL/L (ref 22–29)
CO2 SERPL-SCNC: 27 MMOL/L (ref 22–29)
CREAT SERPL-MCNC: 0.2 MG/DL (ref 0.5–0.9)
CREAT SERPL-MCNC: 0.3 MG/DL (ref 0.5–0.9)
CREAT SERPL-MCNC: 0.5 MG/DL (ref 0.5–0.9)
DAT POLY-SP REAG RBC QL: NORMAL
EOSINOPHIL # BLD: 0.1 K/UL (ref 0–0.6)
EOSINOPHIL NFR BLD: 2.4 % (ref 0–5)
ERYTHROCYTE [DISTWIDTH] IN BLOOD BY AUTOMATED COUNT: 18.6 % (ref 11.5–14.5)
FERRITIN SERPL-MCNC: >2000 NG/ML (ref 13–150)
FOLATE SERPL-MCNC: >20 NG/ML (ref 4.8–37.3)
GLUCOSE SERPL-MCNC: 78 MG/DL (ref 74–109)
GLUCOSE SERPL-MCNC: 84 MG/DL (ref 74–109)
GLUCOSE SERPL-MCNC: 90 MG/DL (ref 74–109)
HAPTOGLOB SERPL-MCNC: <10 MG/DL (ref 30–200)
HAPTOGLOB SERPL-MCNC: <10 MG/DL (ref 30–200)
HAV IGM SERPL QL IA: NORMAL
HBV CORE IGM SERPL QL IA: NORMAL
HBV SURFACE AG SERPL QL IA: NORMAL
HCT VFR BLD AUTO: 22.9 % (ref 37–47)
HCT VFR BLD AUTO: 28.1 % (ref 37–47)
HCV AB SERPL QL IA: NORMAL
HGB BLD-MCNC: 8.2 G/DL (ref 12–16)
IMM GRANULOCYTES # BLD: 0 K/UL
INR PPP: 1.23 (ref 0.88–1.18)
IRON SATN MFR SERPL: 101 % (ref 14–50)
IRON SERPL-MCNC: 159 UG/DL (ref 37–145)
LDH SERPL-CCNC: 305 U/L (ref 91–215)
LDH SERPL-CCNC: 359 U/L (ref 91–215)
LYMPHOCYTES # BLD: 0.7 K/UL (ref 1.1–4.5)
LYMPHOCYTES NFR BLD: 19.6 % (ref 20–40)
MAGNESIUM SERPL-MCNC: 2 MG/DL (ref 1.6–2.4)
MAGNESIUM SERPL-MCNC: 2.1 MG/DL (ref 1.6–2.4)
MCH RBC QN AUTO: 40.8 PG (ref 27–31)
MCHC RBC AUTO-ENTMCNC: 35.8 G/DL (ref 33–37)
MCV RBC AUTO: 113.9 FL (ref 81–99)
MONOCYTES # BLD: 0 K/UL (ref 0–0.9)
MONOCYTES NFR BLD: 0.9 % (ref 0–10)
NEUTROPHILS # BLD: 2.5 K/UL (ref 1.5–7.5)
NEUTS SEG NFR BLD: 76.5 % (ref 50–65)
PATH REV: NORMAL
PHOSPHATE SERPL-MCNC: 3.1 MG/DL (ref 2.5–4.5)
PLATELET # BLD AUTO: 76 K/UL (ref 130–400)
PMV BLD AUTO: 13.8 FL (ref 9.4–12.3)
POTASSIUM SERPL-SCNC: 2.8 MMOL/L (ref 3.5–5)
POTASSIUM SERPL-SCNC: 2.8 MMOL/L (ref 3.5–5)
POTASSIUM SERPL-SCNC: 3.7 MMOL/L (ref 3.5–5)
PROT SERPL-MCNC: 4.8 G/DL (ref 6.6–8.7)
PROTHROMBIN TIME: 15 SEC (ref 12–14.6)
RBC # BLD AUTO: 2.01 M/UL (ref 4.2–5.4)
RETICS # AUTO: 0.02 M/UL (ref 0.03–0.12)
RETICS # AUTO: 0.03 M/UL (ref 0.03–0.12)
RETICS/RBC NFR: 1.03 % (ref 0.5–1.5)
RETICS/RBC NFR: 1.18 % (ref 0.5–1.5)
SODIUM SERPL-SCNC: 138 MMOL/L (ref 136–145)
SODIUM SERPL-SCNC: 138 MMOL/L (ref 136–145)
SODIUM SERPL-SCNC: 140 MMOL/L (ref 136–145)
TIBC SERPL-MCNC: 158 UG/DL (ref 250–400)
VIT B12 SERPL-MCNC: >2000 PG/ML (ref 211–946)
WBC # BLD AUTO: 3.3 K/UL (ref 4.8–10.8)

## 2023-06-03 PROCEDURE — 80053 COMPREHEN METABOLIC PANEL: CPT

## 2023-06-03 PROCEDURE — 82247 BILIRUBIN TOTAL: CPT

## 2023-06-03 PROCEDURE — 82607 VITAMIN B-12: CPT

## 2023-06-03 PROCEDURE — 82728 ASSAY OF FERRITIN: CPT

## 2023-06-03 PROCEDURE — 94760 N-INVAS EAR/PLS OXIMETRY 1: CPT

## 2023-06-03 PROCEDURE — 76705 ECHO EXAM OF ABDOMEN: CPT

## 2023-06-03 PROCEDURE — 6360000002 HC RX W HCPCS: Performed by: HOSPITALIST

## 2023-06-03 PROCEDURE — 83550 IRON BINDING TEST: CPT

## 2023-06-03 PROCEDURE — 85025 COMPLETE CBC W/AUTO DIFF WBC: CPT

## 2023-06-03 PROCEDURE — 83735 ASSAY OF MAGNESIUM: CPT

## 2023-06-03 PROCEDURE — 86880 COOMBS TEST DIRECT: CPT

## 2023-06-03 PROCEDURE — 85397 CLOTTING FUNCT ACTIVITY: CPT

## 2023-06-03 PROCEDURE — 51701 INSERT BLADDER CATHETER: CPT

## 2023-06-03 PROCEDURE — C9113 INJ PANTOPRAZOLE SODIUM, VIA: HCPCS | Performed by: HOSPITALIST

## 2023-06-03 PROCEDURE — 80074 ACUTE HEPATITIS PANEL: CPT

## 2023-06-03 PROCEDURE — 84100 ASSAY OF PHOSPHORUS: CPT

## 2023-06-03 PROCEDURE — 85610 PROTHROMBIN TIME: CPT

## 2023-06-03 PROCEDURE — 83010 ASSAY OF HAPTOGLOBIN QUANT: CPT

## 2023-06-03 PROCEDURE — 99223 1ST HOSP IP/OBS HIGH 75: CPT | Performed by: INTERNAL MEDICINE

## 2023-06-03 PROCEDURE — 82550 ASSAY OF CK (CPK): CPT

## 2023-06-03 PROCEDURE — 85045 AUTOMATED RETICULOCYTE COUNT: CPT

## 2023-06-03 PROCEDURE — 82248 BILIRUBIN DIRECT: CPT

## 2023-06-03 PROCEDURE — 82746 ASSAY OF FOLIC ACID SERUM: CPT

## 2023-06-03 PROCEDURE — 1210000000 HC MED SURG R&B

## 2023-06-03 PROCEDURE — 6370000000 HC RX 637 (ALT 250 FOR IP): Performed by: HOSPITALIST

## 2023-06-03 PROCEDURE — 83615 LACTATE (LD) (LDH) ENZYME: CPT

## 2023-06-03 PROCEDURE — 83540 ASSAY OF IRON: CPT

## 2023-06-03 PROCEDURE — 2580000003 HC RX 258: Performed by: HOSPITALIST

## 2023-06-03 PROCEDURE — 36415 COLL VENOUS BLD VENIPUNCTURE: CPT

## 2023-06-03 PROCEDURE — 51798 US URINE CAPACITY MEASURE: CPT

## 2023-06-03 PROCEDURE — 99222 1ST HOSP IP/OBS MODERATE 55: CPT | Performed by: INTERNAL MEDICINE

## 2023-06-03 RX ORDER — POTASSIUM CHLORIDE 7.45 MG/ML
10 INJECTION INTRAVENOUS PRN
Status: DISCONTINUED | OUTPATIENT
Start: 2023-06-03 | End: 2023-06-08 | Stop reason: HOSPADM

## 2023-06-03 RX ORDER — POTASSIUM CHLORIDE 20 MEQ/1
40 TABLET, EXTENDED RELEASE ORAL PRN
Status: DISCONTINUED | OUTPATIENT
Start: 2023-06-03 | End: 2023-06-08 | Stop reason: HOSPADM

## 2023-06-03 RX ORDER — POTASSIUM CHLORIDE 20 MEQ/1
40 TABLET, EXTENDED RELEASE ORAL ONCE
Status: COMPLETED | OUTPATIENT
Start: 2023-06-03 | End: 2023-06-03

## 2023-06-03 RX ADMIN — Medication 5 MG: at 20:53

## 2023-06-03 RX ADMIN — FOLIC ACID 1 MG: 1 TABLET ORAL at 00:05

## 2023-06-03 RX ADMIN — FOLIC ACID 1 MG: 1 TABLET ORAL at 20:54

## 2023-06-03 RX ADMIN — SODIUM CHLORIDE, PRESERVATIVE FREE 10 ML: 5 INJECTION INTRAVENOUS at 08:07

## 2023-06-03 RX ADMIN — PANTOPRAZOLE SODIUM 8 MG/HR: 40 INJECTION, POWDER, FOR SOLUTION INTRAVENOUS at 10:26

## 2023-06-03 RX ADMIN — POTASSIUM CHLORIDE 40 MEQ: 1500 TABLET, EXTENDED RELEASE ORAL at 05:20

## 2023-06-03 RX ADMIN — PANTOPRAZOLE SODIUM 80 MG: 40 INJECTION, POWDER, FOR SOLUTION INTRAVENOUS at 00:05

## 2023-06-03 RX ADMIN — FOLIC ACID 1 MG: 1 TABLET ORAL at 08:07

## 2023-06-03 RX ADMIN — LEVETIRACETAM 1000 MG: 500 TABLET, FILM COATED ORAL at 20:53

## 2023-06-03 RX ADMIN — Medication 50 MCG: at 08:07

## 2023-06-03 RX ADMIN — AZATHIOPRINE 50 MG: 50 TABLET ORAL at 08:06

## 2023-06-03 RX ADMIN — LEVETIRACETAM 1000 MG: 500 TABLET, FILM COATED ORAL at 00:05

## 2023-06-03 RX ADMIN — OXYBUTYNIN CHLORIDE 10 MG: 5 TABLET, EXTENDED RELEASE ORAL at 08:07

## 2023-06-03 RX ADMIN — HYDROCHLOROTHIAZIDE 12.5 MG: 25 TABLET ORAL at 20:52

## 2023-06-03 RX ADMIN — POLYVINYL ALCOHOL 1 DROP: 14 SOLUTION/ DROPS OPHTHALMIC at 08:07

## 2023-06-03 RX ADMIN — ONDANSETRON 4 MG: 2 INJECTION INTRAMUSCULAR; INTRAVENOUS at 00:05

## 2023-06-03 RX ADMIN — PANTOPRAZOLE SODIUM 8 MG/HR: 40 INJECTION, POWDER, FOR SOLUTION INTRAVENOUS at 20:46

## 2023-06-03 RX ADMIN — PREGABALIN 600 MG: 150 CAPSULE ORAL at 00:05

## 2023-06-03 RX ADMIN — PREGABALIN 600 MG: 150 CAPSULE ORAL at 08:06

## 2023-06-03 RX ADMIN — POTASSIUM CHLORIDE 40 MEQ: 1500 TABLET, EXTENDED RELEASE ORAL at 08:06

## 2023-06-03 RX ADMIN — SODIUM CHLORIDE, SODIUM LACTATE, POTASSIUM CHLORIDE, AND CALCIUM CHLORIDE: 600; 310; 30; 20 INJECTION, SOLUTION INTRAVENOUS at 14:02

## 2023-06-03 RX ADMIN — LEFLUNOMIDE 10 MG: 20 TABLET ORAL at 08:07

## 2023-06-03 RX ADMIN — PREGABALIN 600 MG: 150 CAPSULE ORAL at 20:53

## 2023-06-03 RX ADMIN — LEVETIRACETAM 1000 MG: 500 TABLET, FILM COATED ORAL at 08:07

## 2023-06-03 RX ADMIN — Medication 5000 UNITS: at 08:06

## 2023-06-03 RX ADMIN — SODIUM CHLORIDE, SODIUM LACTATE, POTASSIUM CHLORIDE, AND CALCIUM CHLORIDE: 600; 310; 30; 20 INJECTION, SOLUTION INTRAVENOUS at 00:05

## 2023-06-03 RX ADMIN — HYDROCHLOROTHIAZIDE 12.5 MG: 25 TABLET ORAL at 08:06

## 2023-06-03 RX ADMIN — POLYVINYL ALCOHOL 1 DROP: 14 SOLUTION/ DROPS OPHTHALMIC at 23:34

## 2023-06-03 RX ADMIN — PANTOPRAZOLE SODIUM 8 MG/HR: 40 INJECTION, POWDER, FOR SOLUTION INTRAVENOUS at 00:36

## 2023-06-03 ASSESSMENT — ENCOUNTER SYMPTOMS
SINUS PAIN: 0
ABDOMINAL PAIN: 0
SORE THROAT: 0
DIARRHEA: 0
VOMITING: 1
WHEEZING: 0
SHORTNESS OF BREATH: 0
CONSTIPATION: 0
TROUBLE SWALLOWING: 0
COUGH: 0
ABDOMINAL DISTENTION: 0
NAUSEA: 1
BLOOD IN STOOL: 0
DIARRHEA: 1

## 2023-06-04 LAB
ALBUMIN SERPL-MCNC: 2.2 G/DL (ref 3.5–5.2)
ALP SERPL-CCNC: 133 U/L (ref 35–104)
ALT SERPL-CCNC: 29 U/L (ref 5–33)
ANION GAP SERPL CALCULATED.3IONS-SCNC: 9 MMOL/L (ref 7–19)
AST SERPL-CCNC: 45 U/L (ref 5–32)
BASOPHILS # BLD: 0 K/UL (ref 0–0.2)
BASOPHILS NFR BLD: 0.4 % (ref 0–1)
BILIRUB SERPL-MCNC: 25.5 MG/DL (ref 0.2–1.2)
BUN SERPL-MCNC: 28 MG/DL (ref 8–23)
CALCIUM SERPL-MCNC: 8.7 MG/DL (ref 8.8–10.2)
CHLORIDE SERPL-SCNC: 104 MMOL/L (ref 98–111)
CO2 SERPL-SCNC: 25 MMOL/L (ref 22–29)
CREAT SERPL-MCNC: 0.4 MG/DL (ref 0.5–0.9)
EOSINOPHIL # BLD: 0.1 K/UL (ref 0–0.6)
EOSINOPHIL NFR BLD: 4.1 % (ref 0–5)
ERYTHROCYTE [DISTWIDTH] IN BLOOD BY AUTOMATED COUNT: 18.6 % (ref 11.5–14.5)
GLUCOSE SERPL-MCNC: 121 MG/DL (ref 74–109)
HCT VFR BLD AUTO: 23.3 % (ref 37–47)
HGB BLD-MCNC: 8.2 G/DL (ref 12–16)
IMM GRANULOCYTES # BLD: 0 K/UL
LIPASE SERPL-CCNC: 33 U/L (ref 13–60)
LYMPHOCYTES # BLD: 0.6 K/UL (ref 1.1–4.5)
LYMPHOCYTES NFR BLD: 22 % (ref 20–40)
MAGNESIUM SERPL-MCNC: 2.1 MG/DL (ref 1.6–2.4)
MCH RBC QN AUTO: 40.6 PG (ref 27–31)
MCHC RBC AUTO-ENTMCNC: 35.2 G/DL (ref 33–37)
MCV RBC AUTO: 115.3 FL (ref 81–99)
MONOCYTES # BLD: 0 K/UL (ref 0–0.9)
MONOCYTES NFR BLD: 0.7 % (ref 0–10)
NEUTROPHILS # BLD: 1.9 K/UL (ref 1.5–7.5)
NEUTS SEG NFR BLD: 72.1 % (ref 50–65)
PLATELET # BLD AUTO: 57 K/UL (ref 130–400)
PMV BLD AUTO: 13.7 FL (ref 9.4–12.3)
POTASSIUM SERPL-SCNC: 3.5 MMOL/L (ref 3.5–5)
PROT SERPL-MCNC: 4.2 G/DL (ref 6.6–8.7)
RBC # BLD AUTO: 2.02 M/UL (ref 4.2–5.4)
SODIUM SERPL-SCNC: 138 MMOL/L (ref 136–145)
WBC # BLD AUTO: 2.7 K/UL (ref 4.8–10.8)

## 2023-06-04 PROCEDURE — 94760 N-INVAS EAR/PLS OXIMETRY 1: CPT

## 2023-06-04 PROCEDURE — 1210000000 HC MED SURG R&B

## 2023-06-04 PROCEDURE — 83690 ASSAY OF LIPASE: CPT

## 2023-06-04 PROCEDURE — 99233 SBSQ HOSP IP/OBS HIGH 50: CPT | Performed by: INTERNAL MEDICINE

## 2023-06-04 PROCEDURE — 36415 COLL VENOUS BLD VENIPUNCTURE: CPT

## 2023-06-04 PROCEDURE — 6360000002 HC RX W HCPCS: Performed by: HOSPITALIST

## 2023-06-04 PROCEDURE — 85025 COMPLETE CBC W/AUTO DIFF WBC: CPT

## 2023-06-04 PROCEDURE — 80053 COMPREHEN METABOLIC PANEL: CPT

## 2023-06-04 PROCEDURE — 2580000003 HC RX 258: Performed by: HOSPITALIST

## 2023-06-04 PROCEDURE — C9113 INJ PANTOPRAZOLE SODIUM, VIA: HCPCS | Performed by: HOSPITALIST

## 2023-06-04 PROCEDURE — 6370000000 HC RX 637 (ALT 250 FOR IP): Performed by: HOSPITALIST

## 2023-06-04 PROCEDURE — 51798 US URINE CAPACITY MEASURE: CPT

## 2023-06-04 PROCEDURE — 86356 MONONUCLEAR CELL ANTIGEN: CPT

## 2023-06-04 PROCEDURE — 83735 ASSAY OF MAGNESIUM: CPT

## 2023-06-04 RX ADMIN — PREGABALIN 600 MG: 150 CAPSULE ORAL at 20:37

## 2023-06-04 RX ADMIN — HYDROCHLOROTHIAZIDE 12.5 MG: 25 TABLET ORAL at 20:37

## 2023-06-04 RX ADMIN — PREGABALIN 600 MG: 150 CAPSULE ORAL at 11:03

## 2023-06-04 RX ADMIN — LEVETIRACETAM 1000 MG: 500 TABLET, FILM COATED ORAL at 20:37

## 2023-06-04 RX ADMIN — LEVETIRACETAM 1000 MG: 500 TABLET, FILM COATED ORAL at 11:05

## 2023-06-04 RX ADMIN — POLYVINYL ALCOHOL 1 DROP: 14 SOLUTION/ DROPS OPHTHALMIC at 11:07

## 2023-06-04 RX ADMIN — SODIUM CHLORIDE, PRESERVATIVE FREE 10 ML: 5 INJECTION INTRAVENOUS at 20:37

## 2023-06-04 RX ADMIN — PANTOPRAZOLE SODIUM 8 MG/HR: 40 INJECTION, POWDER, FOR SOLUTION INTRAVENOUS at 20:46

## 2023-06-04 RX ADMIN — PANTOPRAZOLE SODIUM 8 MG/HR: 40 INJECTION, POWDER, FOR SOLUTION INTRAVENOUS at 05:36

## 2023-06-04 RX ADMIN — SODIUM CHLORIDE, SODIUM LACTATE, POTASSIUM CHLORIDE, AND CALCIUM CHLORIDE: 600; 310; 30; 20 INJECTION, SOLUTION INTRAVENOUS at 05:38

## 2023-06-04 RX ADMIN — Medication 5000 UNITS: at 11:04

## 2023-06-04 RX ADMIN — FOLIC ACID 1 MG: 1 TABLET ORAL at 20:36

## 2023-06-04 RX ADMIN — FOLIC ACID 1 MG: 1 TABLET ORAL at 11:05

## 2023-06-04 RX ADMIN — SODIUM CHLORIDE, PRESERVATIVE FREE 10 ML: 5 INJECTION INTRAVENOUS at 11:12

## 2023-06-04 RX ADMIN — LEFLUNOMIDE 10 MG: 20 TABLET ORAL at 11:03

## 2023-06-04 RX ADMIN — OXYBUTYNIN CHLORIDE 10 MG: 5 TABLET, EXTENDED RELEASE ORAL at 11:03

## 2023-06-04 RX ADMIN — Medication 50 MCG: at 11:04

## 2023-06-04 ASSESSMENT — ENCOUNTER SYMPTOMS
DIARRHEA: 0
NAUSEA: 1
RESPIRATORY NEGATIVE: 1
ALLERGIC/IMMUNOLOGIC NEGATIVE: 1
VOMITING: 1
EYES NEGATIVE: 1

## 2023-06-05 PROBLEM — K72.00 ACUTE LIVER FAILURE WITHOUT HEPATIC COMA: Status: ACTIVE | Noted: 2023-06-05

## 2023-06-05 PROBLEM — R79.89 ABNORMAL LFTS: Status: ACTIVE | Noted: 2023-06-05

## 2023-06-05 LAB
ALBUMIN SERPL-MCNC: 2.2 G/DL (ref 3.5–5.2)
ALP SERPL-CCNC: 132 U/L (ref 35–104)
ALT SERPL-CCNC: 27 U/L (ref 5–33)
AMMONIA PLAS-SCNC: 89 UMOL/L (ref 11–51)
ANION GAP SERPL CALCULATED.3IONS-SCNC: 11 MMOL/L (ref 7–19)
ANISOCYTOSIS BLD QL SMEAR: ABNORMAL
APTT PPP: 31.9 SEC (ref 26–36.2)
AST SERPL-CCNC: 42 U/L (ref 5–32)
BASOPHILS # BLD: 0 K/UL (ref 0–0.2)
BASOPHILS NFR BLD: 0 % (ref 0–1)
BILIRUB SERPL-MCNC: 23.7 MG/DL (ref 0.2–1.2)
BUN SERPL-MCNC: 21 MG/DL (ref 8–23)
CALCIUM SERPL-MCNC: 8.3 MG/DL (ref 8.8–10.2)
CHLORIDE SERPL-SCNC: 101 MMOL/L (ref 98–111)
CO2 SERPL-SCNC: 24 MMOL/L (ref 22–29)
CREAT SERPL-MCNC: 0.4 MG/DL (ref 0.5–0.9)
DACRYOCYTES BLD QL SMEAR: ABNORMAL
EOSINOPHIL # BLD: 0.09 K/UL (ref 0–0.6)
EOSINOPHIL NFR BLD: 3 % (ref 0–5)
ERYTHROCYTE [DISTWIDTH] IN BLOOD BY AUTOMATED COUNT: 18.3 % (ref 11.5–14.5)
FERRITIN SERPL-MCNC: 1761 NG/ML (ref 13–150)
GLUCOSE SERPL-MCNC: 116 MG/DL (ref 74–109)
HCT VFR BLD AUTO: 22.5 % (ref 37–47)
HEMOCCULT SP1 STL QL: NORMAL
HGB BLD-MCNC: 8 G/DL (ref 12–16)
IMM GRANULOCYTES # BLD: 0 K/UL
INR PPP: 1.14 (ref 0.88–1.18)
INR PPP: 1.19 (ref 0.88–1.18)
IRON SATN MFR SERPL: ABNORMAL % (ref 14–50)
IRON SERPL-MCNC: 148 UG/DL (ref 37–145)
LYMPHOCYTES # BLD: 0.7 K/UL (ref 1.1–4.5)
LYMPHOCYTES NFR BLD: 23 % (ref 20–40)
MACROCYTES BLD QL SMEAR: ABNORMAL
MAGNESIUM SERPL-MCNC: 1.9 MG/DL (ref 1.6–2.4)
MCH RBC QN AUTO: 40.2 PG (ref 27–31)
MCHC RBC AUTO-ENTMCNC: 35.6 G/DL (ref 33–37)
MCV RBC AUTO: 113.1 FL (ref 81–99)
MONOCYTES # BLD: 0 K/UL (ref 0–0.9)
MONOCYTES NFR BLD: 0 % (ref 0–10)
NEUTROPHILS # BLD: 2.3 K/UL (ref 1.5–7.5)
NEUTS SEG NFR BLD: 74 % (ref 50–65)
PLATELET # BLD AUTO: 44 K/UL (ref 130–400)
PLATELET SLIDE REVIEW: ABNORMAL
POTASSIUM SERPL-SCNC: 3.2 MMOL/L (ref 3.5–5)
PROT SERPL-MCNC: 4.1 G/DL (ref 6.6–8.7)
PROTHROMBIN TIME: 14.3 SEC (ref 12–14.6)
PROTHROMBIN TIME: 14.7 SEC (ref 12–14.6)
RBC # BLD AUTO: 1.99 M/UL (ref 4.2–5.4)
SODIUM SERPL-SCNC: 136 MMOL/L (ref 136–145)
TIBC SERPL-MCNC: ABNORMAL UG/DL (ref 250–400)
WBC # BLD AUTO: 3.1 K/UL (ref 4.8–10.8)

## 2023-06-05 PROCEDURE — 82104 ALPHA-1-ANTITRYPSIN PHENO: CPT

## 2023-06-05 PROCEDURE — 6360000002 HC RX W HCPCS: Performed by: HOSPITALIST

## 2023-06-05 PROCEDURE — 85730 THROMBOPLASTIN TIME PARTIAL: CPT

## 2023-06-05 PROCEDURE — 94760 N-INVAS EAR/PLS OXIMETRY 1: CPT

## 2023-06-05 PROCEDURE — 85610 PROTHROMBIN TIME: CPT

## 2023-06-05 PROCEDURE — 85025 COMPLETE CBC W/AUTO DIFF WBC: CPT

## 2023-06-05 PROCEDURE — 82390 ASSAY OF CERULOPLASMIN: CPT

## 2023-06-05 PROCEDURE — 82103 ALPHA-1-ANTITRYPSIN TOTAL: CPT

## 2023-06-05 PROCEDURE — 82657 ENZYME CELL ACTIVITY: CPT

## 2023-06-05 PROCEDURE — 82728 ASSAY OF FERRITIN: CPT

## 2023-06-05 PROCEDURE — 1210000000 HC MED SURG R&B

## 2023-06-05 PROCEDURE — C9113 INJ PANTOPRAZOLE SODIUM, VIA: HCPCS | Performed by: HOSPITALIST

## 2023-06-05 PROCEDURE — 99222 1ST HOSP IP/OBS MODERATE 55: CPT | Performed by: SPECIALIST

## 2023-06-05 PROCEDURE — 83735 ASSAY OF MAGNESIUM: CPT

## 2023-06-05 PROCEDURE — 82270 OCCULT BLOOD FECES: CPT

## 2023-06-05 PROCEDURE — 83516 IMMUNOASSAY NONANTIBODY: CPT

## 2023-06-05 PROCEDURE — 86256 FLUORESCENT ANTIBODY TITER: CPT

## 2023-06-05 PROCEDURE — 6370000000 HC RX 637 (ALT 250 FOR IP): Performed by: SPECIALIST

## 2023-06-05 PROCEDURE — 36415 COLL VENOUS BLD VENIPUNCTURE: CPT

## 2023-06-05 PROCEDURE — 83540 ASSAY OF IRON: CPT

## 2023-06-05 PROCEDURE — 6370000000 HC RX 637 (ALT 250 FOR IP): Performed by: HOSPITALIST

## 2023-06-05 PROCEDURE — 82525 ASSAY OF COPPER: CPT

## 2023-06-05 PROCEDURE — 80053 COMPREHEN METABOLIC PANEL: CPT

## 2023-06-05 PROCEDURE — 51798 US URINE CAPACITY MEASURE: CPT

## 2023-06-05 PROCEDURE — 82140 ASSAY OF AMMONIA: CPT

## 2023-06-05 PROCEDURE — 83550 IRON BINDING TEST: CPT

## 2023-06-05 PROCEDURE — 85397 CLOTTING FUNCT ACTIVITY: CPT

## 2023-06-05 PROCEDURE — 2580000003 HC RX 258: Performed by: HOSPITALIST

## 2023-06-05 PROCEDURE — 99222 1ST HOSP IP/OBS MODERATE 55: CPT | Performed by: INTERNAL MEDICINE

## 2023-06-05 RX ORDER — LIDOCAINE HYDROCHLORIDE 20 MG/ML
5 INJECTION, SOLUTION EPIDURAL; INFILTRATION; INTRACAUDAL; PERINEURAL ONCE
Status: COMPLETED | OUTPATIENT
Start: 2023-06-06 | End: 2023-06-06

## 2023-06-05 RX ORDER — LACTULOSE 10 G/15ML
30 SOLUTION ORAL 2 TIMES DAILY
Status: DISCONTINUED | OUTPATIENT
Start: 2023-06-05 | End: 2023-06-08 | Stop reason: HOSPADM

## 2023-06-05 RX ORDER — LACTULOSE 10 G/15ML
30 SOLUTION ORAL 2 TIMES DAILY
Status: DISCONTINUED | OUTPATIENT
Start: 2023-06-05 | End: 2023-06-05

## 2023-06-05 RX ADMIN — ESTRADIOL 1 G: 0.1 CREAM VAGINAL at 08:46

## 2023-06-05 RX ADMIN — PREGABALIN 600 MG: 150 CAPSULE ORAL at 08:39

## 2023-06-05 RX ADMIN — FOLIC ACID 1 MG: 1 TABLET ORAL at 22:20

## 2023-06-05 RX ADMIN — FOLIC ACID 1 MG: 1 TABLET ORAL at 08:39

## 2023-06-05 RX ADMIN — POLYVINYL ALCOHOL 1 DROP: 14 SOLUTION/ DROPS OPHTHALMIC at 08:46

## 2023-06-05 RX ADMIN — SODIUM CHLORIDE, PRESERVATIVE FREE 10 ML: 5 INJECTION INTRAVENOUS at 22:21

## 2023-06-05 RX ADMIN — PREGABALIN 600 MG: 150 CAPSULE ORAL at 22:21

## 2023-06-05 RX ADMIN — LEVETIRACETAM 1000 MG: 500 TABLET, FILM COATED ORAL at 08:39

## 2023-06-05 RX ADMIN — Medication 50 MCG: at 08:39

## 2023-06-05 RX ADMIN — HYDROCHLOROTHIAZIDE 12.5 MG: 25 TABLET ORAL at 22:20

## 2023-06-05 RX ADMIN — LEFLUNOMIDE 10 MG: 20 TABLET ORAL at 08:39

## 2023-06-05 RX ADMIN — LEVETIRACETAM 1000 MG: 500 TABLET, FILM COATED ORAL at 22:20

## 2023-06-05 RX ADMIN — OXYBUTYNIN CHLORIDE 10 MG: 5 TABLET, EXTENDED RELEASE ORAL at 08:39

## 2023-06-05 RX ADMIN — LACTULOSE 30 G: 20 SOLUTION ORAL at 22:20

## 2023-06-05 RX ADMIN — Medication 5000 UNITS: at 08:39

## 2023-06-05 RX ADMIN — PANTOPRAZOLE SODIUM 8 MG/HR: 40 INJECTION, POWDER, FOR SOLUTION INTRAVENOUS at 06:20

## 2023-06-05 ASSESSMENT — ENCOUNTER SYMPTOMS
VOMITING: 1
EYES NEGATIVE: 1
RESPIRATORY NEGATIVE: 1
DIARRHEA: 0
ALLERGIC/IMMUNOLOGIC NEGATIVE: 1
NAUSEA: 1

## 2023-06-05 ASSESSMENT — PAIN SCALES - GENERAL: PAINLEVEL_OUTOF10: 0

## 2023-06-05 NOTE — ACP (ADVANCE CARE PLANNING)
Advance Care Planning   Healthcare Decision Maker:    Primary Ting Parents- Child  Secondary Asuncion Ping- Sister Camila Aqq. [phone] [facility] spouse     Documented with Next of [address]

## 2023-06-06 PROBLEM — K71.0: Status: ACTIVE | Noted: 2023-06-06

## 2023-06-06 PROBLEM — D61.818 PANCYTOPENIA (HCC): Status: ACTIVE | Noted: 2023-06-06

## 2023-06-06 PROBLEM — D59.2 DRUG-INDUCED NONAUTOIMMUNE HEMOLYTIC ANEMIA (HCC): Status: ACTIVE | Noted: 2023-06-06

## 2023-06-06 LAB
ALBUMIN SERPL-MCNC: 2.3 G/DL (ref 3.5–5.2)
ALP SERPL-CCNC: 139 U/L (ref 35–104)
ALT SERPL-CCNC: 26 U/L (ref 5–33)
AMMONIA PLAS-SCNC: 58 UMOL/L (ref 11–51)
ANION GAP SERPL CALCULATED.3IONS-SCNC: 12 MMOL/L (ref 7–19)
ANISOCYTOSIS BLD QL SMEAR: ABNORMAL
AST SERPL-CCNC: 45 U/L (ref 5–32)
BASOPHILS # BLD: 0 K/UL (ref 0–0.2)
BASOPHILS NFR BLD: 0 % (ref 0–1)
BILIRUB DIRECT SERPL-MCNC: 16.6 MG/DL (ref 0–0.3)
BILIRUB INDIRECT SERPL-MCNC: 5.9 MG/DL (ref 0.1–1)
BILIRUB SERPL-MCNC: 22.5 MG/DL (ref 0.2–1.2)
BUN SERPL-MCNC: 20 MG/DL (ref 8–23)
CALCIUM SERPL-MCNC: 8.1 MG/DL (ref 8.8–10.2)
CHLORIDE SERPL-SCNC: 104 MMOL/L (ref 98–111)
CO2 SERPL-SCNC: 22 MMOL/L (ref 22–29)
COPPER SERPL-MCNC: 113.8 UG/DL (ref 80–155)
CREAT SERPL-MCNC: 0.5 MG/DL (ref 0.5–0.9)
DACRYOCYTES BLD QL SMEAR: ABNORMAL
EOSINOPHIL # BLD: 0.02 K/UL (ref 0–0.6)
EOSINOPHIL NFR BLD: 1 % (ref 0–5)
ERYTHROCYTE [DISTWIDTH] IN BLOOD BY AUTOMATED COUNT: 18.2 % (ref 11.5–14.5)
GLUCOSE SERPL-MCNC: 98 MG/DL (ref 74–109)
HCT VFR BLD AUTO: 22 % (ref 37–47)
HGB BLD-MCNC: 7.9 G/DL (ref 12–16)
IMM GRANULOCYTES # BLD: 0 K/UL
INR PPP: 1.29 (ref 0.88–1.18)
LYMPHOCYTES # BLD: 0.6 K/UL (ref 1.1–4.5)
LYMPHOCYTES NFR BLD: 28 % (ref 20–40)
MACROCYTES BLD QL SMEAR: ABNORMAL
MAGNESIUM SERPL-MCNC: 1.8 MG/DL (ref 1.6–2.4)
MCH RBC QN AUTO: 40.7 PG (ref 27–31)
MCHC RBC AUTO-ENTMCNC: 35.9 G/DL (ref 33–37)
MCV RBC AUTO: 113.4 FL (ref 81–99)
MONOCYTES # BLD: 0 K/UL (ref 0–0.9)
MONOCYTES NFR BLD: 0 % (ref 0–10)
NEUTROPHILS # BLD: 1.6 K/UL (ref 1.5–7.5)
NEUTS SEG NFR BLD: 71 % (ref 50–65)
OVALOCYTES BLD QL SMEAR: ABNORMAL
PLATELET # BLD AUTO: 29 K/UL (ref 130–400)
PLATELET SLIDE REVIEW: ABNORMAL
POLYCHROMASIA BLD QL SMEAR: ABNORMAL
POTASSIUM SERPL-SCNC: 3.3 MMOL/L (ref 3.5–5)
PROT SERPL-MCNC: 3.6 G/DL (ref 6.6–8.7)
PROTHROMBIN TIME: 15.8 SEC (ref 12–14.6)
RBC # BLD AUTO: 1.94 M/UL (ref 4.2–5.4)
SODIUM SERPL-SCNC: 138 MMOL/L (ref 136–145)
STOMATOCYTES BLD QL SMEAR: ABNORMAL
WBC # BLD AUTO: 2.3 K/UL (ref 4.8–10.8)

## 2023-06-06 PROCEDURE — 2500000003 HC RX 250 WO HCPCS: Performed by: NURSE PRACTITIONER

## 2023-06-06 PROCEDURE — 07DR3ZX EXTRACTION OF ILIAC BONE MARROW, PERCUTANEOUS APPROACH, DIAGNOSTIC: ICD-10-PCS | Performed by: INTERNAL MEDICINE

## 2023-06-06 PROCEDURE — APPSS15 APP SPLIT SHARED TIME 0-15 MINUTES: Performed by: PHYSICIAN ASSISTANT

## 2023-06-06 PROCEDURE — 51701 INSERT BLADDER CATHETER: CPT

## 2023-06-06 PROCEDURE — 1210000000 HC MED SURG R&B

## 2023-06-06 PROCEDURE — 94760 N-INVAS EAR/PLS OXIMETRY 1: CPT

## 2023-06-06 PROCEDURE — 6370000000 HC RX 637 (ALT 250 FOR IP): Performed by: HOSPITALIST

## 2023-06-06 PROCEDURE — 2580000003 HC RX 258: Performed by: HOSPITALIST

## 2023-06-06 PROCEDURE — 80053 COMPREHEN METABOLIC PANEL: CPT

## 2023-06-06 PROCEDURE — 36415 COLL VENOUS BLD VENIPUNCTURE: CPT

## 2023-06-06 PROCEDURE — 88311 DECALCIFY TISSUE: CPT

## 2023-06-06 PROCEDURE — C9113 INJ PANTOPRAZOLE SODIUM, VIA: HCPCS | Performed by: HOSPITALIST

## 2023-06-06 PROCEDURE — 85025 COMPLETE CBC W/AUTO DIFF WBC: CPT

## 2023-06-06 PROCEDURE — 38222 DX BONE MARROW BX & ASPIR: CPT | Performed by: PHYSICIAN ASSISTANT

## 2023-06-06 PROCEDURE — 6360000002 HC RX W HCPCS: Performed by: HOSPITALIST

## 2023-06-06 PROCEDURE — 99232 SBSQ HOSP IP/OBS MODERATE 35: CPT | Performed by: INTERNAL MEDICINE

## 2023-06-06 PROCEDURE — 88305 TISSUE EXAM BY PATHOLOGIST: CPT

## 2023-06-06 PROCEDURE — 82140 ASSAY OF AMMONIA: CPT

## 2023-06-06 PROCEDURE — 51798 US URINE CAPACITY MEASURE: CPT

## 2023-06-06 PROCEDURE — 6370000000 HC RX 637 (ALT 250 FOR IP): Performed by: SPECIALIST

## 2023-06-06 PROCEDURE — 82248 BILIRUBIN DIRECT: CPT

## 2023-06-06 PROCEDURE — 88313 SPECIAL STAINS GROUP 2: CPT

## 2023-06-06 PROCEDURE — 83735 ASSAY OF MAGNESIUM: CPT

## 2023-06-06 PROCEDURE — 85610 PROTHROMBIN TIME: CPT

## 2023-06-06 RX ADMIN — SODIUM CHLORIDE, SODIUM LACTATE, POTASSIUM CHLORIDE, AND CALCIUM CHLORIDE: 600; 310; 30; 20 INJECTION, SOLUTION INTRAVENOUS at 22:00

## 2023-06-06 RX ADMIN — LACTULOSE 30 G: 20 SOLUTION ORAL at 08:56

## 2023-06-06 RX ADMIN — LEVETIRACETAM 1000 MG: 500 TABLET, FILM COATED ORAL at 21:30

## 2023-06-06 RX ADMIN — SODIUM CHLORIDE, PRESERVATIVE FREE 10 ML: 5 INJECTION INTRAVENOUS at 21:31

## 2023-06-06 RX ADMIN — FOLIC ACID 1 MG: 1 TABLET ORAL at 08:56

## 2023-06-06 RX ADMIN — Medication 5000 UNITS: at 08:54

## 2023-06-06 RX ADMIN — PREGABALIN 600 MG: 150 CAPSULE ORAL at 21:31

## 2023-06-06 RX ADMIN — PREGABALIN 600 MG: 150 CAPSULE ORAL at 08:55

## 2023-06-06 RX ADMIN — HYDROCHLOROTHIAZIDE 12.5 MG: 25 TABLET ORAL at 21:30

## 2023-06-06 RX ADMIN — LEVETIRACETAM 1000 MG: 500 TABLET, FILM COATED ORAL at 08:56

## 2023-06-06 RX ADMIN — LIDOCAINE HYDROCHLORIDE 5 ML: 20 INJECTION, SOLUTION EPIDURAL; INFILTRATION; INTRACAUDAL; PERINEURAL at 06:48

## 2023-06-06 RX ADMIN — FOLIC ACID 1 MG: 1 TABLET ORAL at 21:30

## 2023-06-06 RX ADMIN — LACTULOSE 30 G: 20 SOLUTION ORAL at 21:31

## 2023-06-06 RX ADMIN — PANTOPRAZOLE SODIUM 8 MG/HR: 40 INJECTION, POWDER, FOR SOLUTION INTRAVENOUS at 22:01

## 2023-06-06 RX ADMIN — POLYVINYL ALCOHOL 1 DROP: 14 SOLUTION/ DROPS OPHTHALMIC at 08:59

## 2023-06-06 RX ADMIN — Medication 50 MCG: at 08:56

## 2023-06-06 RX ADMIN — OXYBUTYNIN CHLORIDE 10 MG: 5 TABLET, EXTENDED RELEASE ORAL at 08:56

## 2023-06-06 RX ADMIN — PANTOPRAZOLE SODIUM 8 MG/HR: 40 INJECTION, POWDER, FOR SOLUTION INTRAVENOUS at 01:51

## 2023-06-06 ASSESSMENT — ENCOUNTER SYMPTOMS
VOMITING: 1
DIARRHEA: 0
NAUSEA: 1
ALLERGIC/IMMUNOLOGIC NEGATIVE: 1
EYES NEGATIVE: 1
RESPIRATORY NEGATIVE: 1

## 2023-06-06 NOTE — CASE COMMUNICATION
Update. Call center called. [place] not in network with pt. Call center notifying [facility].

## 2023-06-06 NOTE — PROCEDURES
Patient name: Lincoln Durant  Patient : 1950      Procedure Note: Bone Marrow Aspirate and Biopsy    Indication:  Pancytopenia    Informed consent was signed by Lincoln Durant. she  was placed in the left lateral decubitus position. The patient was prepped and draped in sterile fashion. Lidocaine 1% was used for local anesthetic of the skin and periosteum. A bone marrow aspirate and biopsy was obtained from the [facility] and sent for surgical pathology review,  flow cytometry, and chromosome analysis. The total blood loss was less than 3 mL. The skin was cleaned and a sterile bandage was placed on the entrance site. Lincoln Durant tolerated the procedure without complication.      Yoana Hendricks PA-C    23  6:47 AM

## 2023-06-06 NOTE — PLAN OF CARE
Problem: Discharge Planning  Goal: Discharge to home or other facility with appropriate resources  6/5/2023 0725 by Adriel Lucio RN  Outcome: Progressing  6/4/2023 2307 by Morris Kaminski RN  Outcome: Progressing     Problem: Safety - Adult  Goal: Free from fall injury  6/5/2023 0725 by Adriel Lucio RN  Outcome: Progressing  6/4/2023 2307 by Morris Kaminski RN  Outcome: Progressing     Problem: ABCDS Injury Assessment  Goal: Absence of physical injury  6/5/2023 0725 by Adriel Lucio RN  Outcome: Progressing  6/4/2023 2307 by Morris Kaminski RN  Outcome: Progressing     Problem: Neurosensory - Adult  Goal: Achieves stable or improved neurological status  Outcome: Progressing  Goal: Achieves maximal functionality and self care  Outcome: Progressing     Problem: Gastrointestinal - Adult  Goal: Minimal or absence of nausea and vomiting  Outcome: Progressing  Goal: Maintains or returns to baseline bowel function  Outcome: Progressing  Goal: Maintains adequate nutritional intake  Outcome: Progressing  Goal: Establish and maintain optimal ostomy function  Outcome: Progressing     Problem: Metabolic/Fluid and Electrolytes - Adult  Goal: Electrolytes maintained within normal limits  Outcome: Progressing  Goal: Hemodynamic stability and optimal renal function maintained  Outcome: Progressing  Goal: Glucose maintained within prescribed range  Outcome: Progressing     Problem: Hematologic - Adult  Goal: Maintains hematologic stability  Outcome: Progressing
RN  Outcome: Not Progressing  6/6/2023 0424 by Judi Canas RN  Outcome: Progressing  Goal: Establish and maintain optimal ostomy function  6/6/2023 1327 by Chyna Rose RN  Outcome: Not Progressing  6/6/2023 0424 by Judi Canas RN  Outcome: Progressing     Problem: Metabolic/Fluid and Electrolytes - Adult  Goal: Electrolytes maintained within normal limits  6/6/2023 1327 by Chyna Rose RN  Outcome: Not Progressing  6/6/2023 0424 by Judi Canas RN  Outcome: Progressing  Goal: Hemodynamic stability and optimal renal function maintained  6/6/2023 1327 by Chyna Rose RN  Outcome: Not Progressing  6/6/2023 0424 by Judi Canas RN  Outcome: Progressing  Goal: Glucose maintained within prescribed range  6/6/2023 1327 by Chyna Rose RN  Outcome: Not Progressing  6/6/2023 0424 by Judi Canas RN  Outcome: Progressing     Problem: Hematologic - Adult  Goal: Maintains hematologic stability  6/6/2023 1327 by Chyna Rose RN  Outcome: Not Progressing  6/6/2023 0424 by Judi Canas RN  Outcome: Progressing

## 2023-06-07 PROBLEM — K76.82 HEPATIC ENCEPHALOPATHY (HCC): Status: ACTIVE | Noted: 2023-06-07

## 2023-06-07 LAB
AMMONIA PLAS-SCNC: 50 UMOL/L (ref 11–51)
ANION GAP SERPL CALCULATED.3IONS-SCNC: 8 MMOL/L (ref 7–19)
APAP SERPL-MCNC: <5 UG/ML (ref 10–30)
BASOPHILS # BLD: 0 K/UL (ref 0–0.2)
BASOPHILS NFR BLD: 0 % (ref 0–1)
BILIRUB DIRECT SERPL-MCNC: 18 MG/DL (ref 0–0.3)
BILIRUB INDIRECT SERPL-MCNC: 4.7 MG/DL (ref 0.1–1)
BILIRUB SERPL-MCNC: 22.7 MG/DL (ref 0.2–1.2)
BUN SERPL-MCNC: 15 MG/DL (ref 8–23)
CALCIUM SERPL-MCNC: 8.5 MG/DL (ref 8.8–10.2)
CHLORIDE SERPL-SCNC: 101 MMOL/L (ref 98–111)
CO2 SERPL-SCNC: 24 MMOL/L (ref 22–29)
CREAT SERPL-MCNC: 0.5 MG/DL (ref 0.5–0.9)
EOSINOPHIL # BLD: 0.04 K/UL (ref 0–0.6)
EOSINOPHIL NFR BLD: 2 % (ref 0–5)
ERYTHROCYTE [DISTWIDTH] IN BLOOD BY AUTOMATED COUNT: 17.9 % (ref 11.5–14.5)
GLIADIN PEPTIDE+TTG IGA+IGG SER QL IA: 4 UNITS (ref 0–19)
GLUCOSE SERPL-MCNC: 101 MG/DL (ref 74–109)
HCT VFR BLD AUTO: 20.8 % (ref 37–47)
HGB BLD-MCNC: 7.6 G/DL (ref 12–16)
IMM GRANULOCYTES # BLD: 0 K/UL
LYMPHOCYTES # BLD: 0.4 K/UL (ref 1.1–4.5)
LYMPHOCYTES NFR BLD: 19 % (ref 20–40)
MACROCYTES BLD QL SMEAR: ABNORMAL
MAGNESIUM SERPL-MCNC: 2 MG/DL (ref 1.6–2.4)
MCH RBC QN AUTO: 40 PG (ref 27–31)
MCHC RBC AUTO-ENTMCNC: 36.5 G/DL (ref 33–37)
MCV RBC AUTO: 109.5 FL (ref 81–99)
MONOCYTES # BLD: 0 K/UL (ref 0–0.9)
MONOCYTES NFR BLD: 3 % (ref 0–10)
NEUTROPHILS # BLD: 1.5 K/UL (ref 1.5–7.5)
NEUTS BAND NFR BLD MANUAL: 1 % (ref 0–5)
NEUTS SEG NFR BLD: 76 % (ref 50–65)
PLATELET # BLD AUTO: 26 K/UL (ref 130–400)
PLATELET SLIDE REVIEW: ABNORMAL
POTASSIUM SERPL-SCNC: 3 MMOL/L (ref 3.5–5)
POTASSIUM SERPL-SCNC: 3.5 MMOL/L (ref 3.5–5)
RBC # BLD AUTO: 1.9 M/UL (ref 4.2–5.4)
SODIUM SERPL-SCNC: 133 MMOL/L (ref 136–145)
WBC # BLD AUTO: 2 K/UL (ref 4.8–10.8)

## 2023-06-07 PROCEDURE — 80048 BASIC METABOLIC PNL TOTAL CA: CPT

## 2023-06-07 PROCEDURE — C9113 INJ PANTOPRAZOLE SODIUM, VIA: HCPCS | Performed by: HOSPITALIST

## 2023-06-07 PROCEDURE — 97163 PT EVAL HIGH COMPLEX 45 MIN: CPT

## 2023-06-07 PROCEDURE — 85025 COMPLETE CBC W/AUTO DIFF WBC: CPT

## 2023-06-07 PROCEDURE — 82140 ASSAY OF AMMONIA: CPT

## 2023-06-07 PROCEDURE — 99232 SBSQ HOSP IP/OBS MODERATE 35: CPT | Performed by: INTERNAL MEDICINE

## 2023-06-07 PROCEDURE — 82248 BILIRUBIN DIRECT: CPT

## 2023-06-07 PROCEDURE — 83735 ASSAY OF MAGNESIUM: CPT

## 2023-06-07 PROCEDURE — 97110 THERAPEUTIC EXERCISES: CPT

## 2023-06-07 PROCEDURE — 51701 INSERT BLADDER CATHETER: CPT

## 2023-06-07 PROCEDURE — 1210000000 HC MED SURG R&B

## 2023-06-07 PROCEDURE — 6360000002 HC RX W HCPCS: Performed by: HOSPITALIST

## 2023-06-07 PROCEDURE — 84132 ASSAY OF SERUM POTASSIUM: CPT

## 2023-06-07 PROCEDURE — 51798 US URINE CAPACITY MEASURE: CPT

## 2023-06-07 PROCEDURE — 80143 DRUG ASSAY ACETAMINOPHEN: CPT

## 2023-06-07 PROCEDURE — 99232 SBSQ HOSP IP/OBS MODERATE 35: CPT | Performed by: SPECIALIST

## 2023-06-07 PROCEDURE — 94760 N-INVAS EAR/PLS OXIMETRY 1: CPT

## 2023-06-07 PROCEDURE — 2580000003 HC RX 258: Performed by: HOSPITALIST

## 2023-06-07 PROCEDURE — 36415 COLL VENOUS BLD VENIPUNCTURE: CPT

## 2023-06-07 PROCEDURE — 6370000000 HC RX 637 (ALT 250 FOR IP): Performed by: SPECIALIST

## 2023-06-07 PROCEDURE — 82247 BILIRUBIN TOTAL: CPT

## 2023-06-07 PROCEDURE — 97530 THERAPEUTIC ACTIVITIES: CPT

## 2023-06-07 PROCEDURE — 6370000000 HC RX 637 (ALT 250 FOR IP): Performed by: HOSPITALIST

## 2023-06-07 RX ADMIN — FOLIC ACID 1 MG: 1 TABLET ORAL at 07:54

## 2023-06-07 RX ADMIN — Medication 50 MCG: at 07:55

## 2023-06-07 RX ADMIN — LEFLUNOMIDE 10 MG: 20 TABLET ORAL at 07:54

## 2023-06-07 RX ADMIN — POTASSIUM CHLORIDE 10 MEQ: 10 INJECTION, SOLUTION INTRAVENOUS at 13:30

## 2023-06-07 RX ADMIN — PANTOPRAZOLE SODIUM 8 MG/HR: 40 INJECTION, POWDER, FOR SOLUTION INTRAVENOUS at 04:22

## 2023-06-07 RX ADMIN — POTASSIUM CHLORIDE 10 MEQ: 10 INJECTION, SOLUTION INTRAVENOUS at 06:41

## 2023-06-07 RX ADMIN — Medication 5000 UNITS: at 07:54

## 2023-06-07 RX ADMIN — OXYBUTYNIN CHLORIDE 10 MG: 5 TABLET, EXTENDED RELEASE ORAL at 07:55

## 2023-06-07 RX ADMIN — LEVETIRACETAM 1000 MG: 500 TABLET, FILM COATED ORAL at 20:05

## 2023-06-07 RX ADMIN — LACTULOSE 30 G: 20 SOLUTION ORAL at 20:05

## 2023-06-07 RX ADMIN — POLYVINYL ALCOHOL 1 DROP: 14 SOLUTION/ DROPS OPHTHALMIC at 22:00

## 2023-06-07 RX ADMIN — POLYVINYL ALCOHOL 1 DROP: 14 SOLUTION/ DROPS OPHTHALMIC at 16:44

## 2023-06-07 RX ADMIN — POTASSIUM CHLORIDE 10 MEQ: 10 INJECTION, SOLUTION INTRAVENOUS at 10:14

## 2023-06-07 RX ADMIN — PREGABALIN 600 MG: 150 CAPSULE ORAL at 07:55

## 2023-06-07 RX ADMIN — FOLIC ACID 1 MG: 1 TABLET ORAL at 20:04

## 2023-06-07 RX ADMIN — PREGABALIN 600 MG: 150 CAPSULE ORAL at 20:05

## 2023-06-07 RX ADMIN — LEVETIRACETAM 1000 MG: 500 TABLET, FILM COATED ORAL at 07:55

## 2023-06-07 RX ADMIN — POLYVINYL ALCOHOL 1 DROP: 14 SOLUTION/ DROPS OPHTHALMIC at 07:58

## 2023-06-07 RX ADMIN — POTASSIUM CHLORIDE 10 MEQ: 10 INJECTION, SOLUTION INTRAVENOUS at 12:14

## 2023-06-07 RX ADMIN — POTASSIUM CHLORIDE 10 MEQ: 10 INJECTION, SOLUTION INTRAVENOUS at 08:37

## 2023-06-07 RX ADMIN — POTASSIUM CHLORIDE 10 MEQ: 10 INJECTION, SOLUTION INTRAVENOUS at 07:35

## 2023-06-07 ASSESSMENT — ENCOUNTER SYMPTOMS
EYES NEGATIVE: 1
NAUSEA: 1
RESPIRATORY NEGATIVE: 1
ALLERGIC/IMMUNOLOGIC NEGATIVE: 1
DIARRHEA: 0
VOMITING: 1

## 2023-06-08 VITALS
HEART RATE: 66 BPM | TEMPERATURE: 97.2 F | HEIGHT: 66 IN | WEIGHT: 161 LBS | DIASTOLIC BLOOD PRESSURE: 70 MMHG | SYSTOLIC BLOOD PRESSURE: 127 MMHG | BODY MASS INDEX: 25.88 KG/M2 | OXYGEN SATURATION: 94 % | RESPIRATION RATE: 16 BRPM

## 2023-06-08 PROBLEM — K75.4 AUTOIMMUNE HEPATITIS (HCC): Status: ACTIVE | Noted: 2023-06-08

## 2023-06-08 LAB
ALBUMIN SERPL-MCNC: 2.3 G/DL (ref 3.5–5.2)
ALP SERPL-CCNC: 166 U/L (ref 35–104)
ALT SERPL-CCNC: 27 U/L (ref 5–33)
ANION GAP SERPL CALCULATED.3IONS-SCNC: 11 MMOL/L (ref 7–19)
ANISOCYTOSIS BLD QL SMEAR: ABNORMAL
APAP SERPL-MCNC: <5 UG/ML (ref 10–30)
AST SERPL-CCNC: 48 U/L (ref 5–32)
BASOPHILS # BLD: 0 K/UL (ref 0–0.2)
BASOPHILS NFR BLD: 0 % (ref 0–1)
BILIRUB DIRECT SERPL-MCNC: 15.1 MG/DL (ref 0–0.3)
BILIRUB INDIRECT SERPL-MCNC: 5.1 MG/DL (ref 0.1–1)
BILIRUB SERPL-MCNC: 20.2 MG/DL (ref 0.2–1.2)
BUN SERPL-MCNC: 12 MG/DL (ref 8–23)
CALCIUM SERPL-MCNC: 8.3 MG/DL (ref 8.8–10.2)
CHLORIDE SERPL-SCNC: 105 MMOL/L (ref 98–111)
CO2 SERPL-SCNC: 23 MMOL/L (ref 22–29)
CREAT SERPL-MCNC: 0.5 MG/DL (ref 0.5–0.9)
EOSINOPHIL # BLD: 0.05 K/UL (ref 0–0.6)
EOSINOPHIL NFR BLD: 3 % (ref 0–5)
ERYTHROCYTE [DISTWIDTH] IN BLOOD BY AUTOMATED COUNT: 18.4 % (ref 11.5–14.5)
GLUCOSE SERPL-MCNC: 94 MG/DL (ref 74–109)
HCT VFR BLD AUTO: 21.7 % (ref 37–47)
HGB BLD-MCNC: 8 G/DL (ref 12–16)
IGG SERPL-MCNC: 767 MG/DL (ref 700–1600)
IMM GRANULOCYTES # BLD: 0 K/UL
INR PPP: 1.65 (ref 0.88–1.18)
LYMPHOCYTES # BLD: 0.5 K/UL (ref 1.1–4.5)
LYMPHOCYTES NFR BLD: 27 % (ref 20–40)
MACROCYTES BLD QL SMEAR: ABNORMAL
MAGNESIUM SERPL-MCNC: 2 MG/DL (ref 1.6–2.4)
MCH RBC QN AUTO: 41 PG (ref 27–31)
MCHC RBC AUTO-ENTMCNC: 36.9 G/DL (ref 33–37)
MCV RBC AUTO: 111.3 FL (ref 81–99)
MITOCHONDRIA M2 IGG SER-ACNC: 2.5 UNITS (ref 0–24.9)
MONOCYTES # BLD: 0.1 K/UL (ref 0–0.9)
MONOCYTES NFR BLD: 3 % (ref 0–10)
NEUTROPHILS # BLD: 1.2 K/UL (ref 1.5–7.5)
NEUTS BAND NFR BLD MANUAL: 2 % (ref 0–5)
NEUTS SEG NFR BLD: 65 % (ref 50–65)
PLATELET # BLD AUTO: 26 K/UL (ref 130–400)
PLATELET SLIDE REVIEW: ABNORMAL
POLYCHROMASIA BLD QL SMEAR: ABNORMAL
POTASSIUM SERPL-SCNC: 3.5 MMOL/L (ref 3.5–5)
PROT SERPL-MCNC: 3.8 G/DL (ref 6.6–8.7)
PROTHROMBIN TIME: 19.1 SEC (ref 12–14.6)
RBC # BLD AUTO: 1.95 M/UL (ref 4.2–5.4)
SMA IGG SER-ACNC: 46 UNITS (ref 0–19)
SMOOTH MUSCLE IGG TITR SER: ABNORMAL {TITER}
SODIUM SERPL-SCNC: 139 MMOL/L (ref 136–145)
WBC # BLD AUTO: 1.8 K/UL (ref 4.8–10.8)

## 2023-06-08 PROCEDURE — 6370000000 HC RX 637 (ALT 250 FOR IP): Performed by: HOSPITALIST

## 2023-06-08 PROCEDURE — 6370000000 HC RX 637 (ALT 250 FOR IP): Performed by: STUDENT IN AN ORGANIZED HEALTH CARE EDUCATION/TRAINING PROGRAM

## 2023-06-08 PROCEDURE — 6370000000 HC RX 637 (ALT 250 FOR IP): Performed by: SPECIALIST

## 2023-06-08 PROCEDURE — 36415 COLL VENOUS BLD VENIPUNCTURE: CPT

## 2023-06-08 PROCEDURE — 99232 SBSQ HOSP IP/OBS MODERATE 35: CPT | Performed by: INTERNAL MEDICINE

## 2023-06-08 PROCEDURE — 80053 COMPREHEN METABOLIC PANEL: CPT

## 2023-06-08 PROCEDURE — 2580000003 HC RX 258: Performed by: HOSPITALIST

## 2023-06-08 PROCEDURE — C9113 INJ PANTOPRAZOLE SODIUM, VIA: HCPCS | Performed by: HOSPITALIST

## 2023-06-08 PROCEDURE — 6360000002 HC RX W HCPCS: Performed by: HOSPITALIST

## 2023-06-08 PROCEDURE — 82248 BILIRUBIN DIRECT: CPT

## 2023-06-08 PROCEDURE — 80143 DRUG ASSAY ACETAMINOPHEN: CPT

## 2023-06-08 PROCEDURE — 83735 ASSAY OF MAGNESIUM: CPT

## 2023-06-08 PROCEDURE — 94760 N-INVAS EAR/PLS OXIMETRY 1: CPT

## 2023-06-08 PROCEDURE — 85610 PROTHROMBIN TIME: CPT

## 2023-06-08 PROCEDURE — 82784 ASSAY IGA/IGD/IGG/IGM EACH: CPT

## 2023-06-08 PROCEDURE — 51798 US URINE CAPACITY MEASURE: CPT

## 2023-06-08 PROCEDURE — 85025 COMPLETE CBC W/AUTO DIFF WBC: CPT

## 2023-06-08 PROCEDURE — 6360000002 HC RX W HCPCS: Performed by: NURSE PRACTITIONER

## 2023-06-08 RX ORDER — LACTULOSE 10 G/15ML
30 SOLUTION ORAL 2 TIMES DAILY
Qty: 1 EACH | Refills: 0 | Status: SHIPPED | OUTPATIENT
Start: 2023-06-08 | End: 2023-07-08

## 2023-06-08 RX ORDER — METHYLPREDNISOLONE SODIUM SUCCINATE 125 MG/2ML
60 INJECTION, POWDER, LYOPHILIZED, FOR SOLUTION INTRAMUSCULAR; INTRAVENOUS ONCE
Status: COMPLETED | OUTPATIENT
Start: 2023-06-08 | End: 2023-06-08

## 2023-06-08 RX ORDER — MECOBALAMIN 5000 MCG
5 TABLET,DISINTEGRATING ORAL NIGHTLY PRN
Qty: 30 TABLET | Refills: 0 | Status: SHIPPED | OUTPATIENT
Start: 2023-06-08 | End: 2023-07-08

## 2023-06-08 RX ORDER — POLYVINYL ALCOHOL 14 MG/ML
1 SOLUTION/ DROPS OPHTHALMIC 4 TIMES DAILY
Qty: 1 EACH | Refills: 0 | Status: SHIPPED | OUTPATIENT
Start: 2023-06-08 | End: 2023-07-08

## 2023-06-08 RX ADMIN — HYDROCHLOROTHIAZIDE 12.5 MG: 25 TABLET ORAL at 07:35

## 2023-06-08 RX ADMIN — PANTOPRAZOLE SODIUM 8 MG/HR: 40 INJECTION, POWDER, FOR SOLUTION INTRAVENOUS at 02:46

## 2023-06-08 RX ADMIN — OXYBUTYNIN CHLORIDE 10 MG: 5 TABLET, EXTENDED RELEASE ORAL at 07:34

## 2023-06-08 RX ADMIN — FOLIC ACID 1 MG: 1 TABLET ORAL at 07:35

## 2023-06-08 RX ADMIN — PREGABALIN 600 MG: 150 CAPSULE ORAL at 07:34

## 2023-06-08 RX ADMIN — METHYLPREDNISOLONE SODIUM SUCCINATE 60 MG: 125 INJECTION, POWDER, FOR SOLUTION INTRAMUSCULAR; INTRAVENOUS at 11:54

## 2023-06-08 RX ADMIN — LACTULOSE 30 G: 20 SOLUTION ORAL at 07:33

## 2023-06-08 RX ADMIN — ESTRADIOL 1 G: 0.1 CREAM VAGINAL at 07:36

## 2023-06-08 RX ADMIN — Medication 50 MCG: at 07:34

## 2023-06-08 RX ADMIN — Medication 5000 UNITS: at 07:34

## 2023-06-08 RX ADMIN — RIFAXIMIN 400 MG: 200 TABLET ORAL at 15:13

## 2023-06-08 RX ADMIN — RIFAXIMIN 400 MG: 200 TABLET ORAL at 10:20

## 2023-06-08 ASSESSMENT — ENCOUNTER SYMPTOMS
EYES NEGATIVE: 1
DIARRHEA: 0
VOMITING: 1
ALLERGIC/IMMUNOLOGIC NEGATIVE: 1
NAUSEA: 1
RESPIRATORY NEGATIVE: 1

## 2023-06-09 LAB
A1AT PHENOTYP SERPL-IMP: NORMAL
A1AT SERPL-MCNC: 163 MG/DL (ref 90–200)
CERULOPLASMIN SERPL-MCNC: 19 MG/DL (ref 16–45)

## 2023-06-09 NOTE — PROGRESS NOTES
24 hour orders verified
ALTAGRACIA and ARMINDA Kilpatrick talked with Dr. Britt Proctor, hepatologist at [facility]. Discussed the case with Dr. Britt Proctor. Dr. Britt Proctor will accept the patient on hospitalist service once they have bed available. FYI: Per discussion with Dr. Britt Proctor, definition of acute liver failure is INR 1.5 or more and new hepatic encephalopathy in the absence of underlying liver disease. Patient did not have any obvious liver disease in January, 2023.   Her LFTs were normal.
[facility]      Progress Note    Patient:  Dwayne Erazo  YOB: 1950  Date of Service: 6/5/2023  MRN: [id number]   Acct: [de-identified]   Primary Care Physician: Mainor Silveira  Advance Directive: Full Code  Admit Date: 6/2/2023       Hospital Day: 3    Portions of this note have been copied forward, however, updated to reflect the most current clinical status of this patient. CHIEF COMPLAINT nausea and vomiting    SUBJECTIVE: No new complaints today. No vomiting or diarrhea. CUMULATIVE HOSPITAL COURSE:   Patient is a 68-year-old with past medical history of dermatomyositis, seizures, lupus, seizure disorder, vitamin D deficiency and Parkinson's. Patient was transferred from an outside hospital with severely elevated bilirubin and jaundice. She reported feeling tired nauseous and decreased appetite for 2 weeks she also reported frequent falls at home. Also reported weight loss recently. Laboratory from outside hospital bilirubin 35.6 direct 23 potassium 2.6 white count 4.1 elevated liver enzymes. CT of the abdomen indicated history of cholecystectomy and a normal liver. Patient was transferred here for GI eval.  Viral hepatitis panel and right upper quadrant ultrasound was ordered hepatitis panel was negative and ultrasound did not reveal any intrinsic disease or obstruction hematology was seen consulted for hemolytic anemia and pancytopenia Rigoberto test was negative. Is to have a bone marrow biopsy early next week. Liver enzymes mildly improved today. BILI- 23.7 hgb 8.0/ hct 22.5, plt 44. Transfer to facility with hepatologist initiated. Review of Systems   Constitutional:  Positive for activity change, fatigue and unexpected weight change. HENT: Negative. Eyes: Negative. Respiratory: Negative. Cardiovascular: Negative. Gastrointestinal:  Positive for nausea and vomiting. Negative for diarrhea. Endocrine: Negative.     Musculoskeletal:  Negative for
I saw the patient again. 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  She knows patient's name. Patient could not tell me. She knows the month of June. She could not tell me the date. She could not tell me the day of the week. Her ammonia is elevated to 89. On coagulopathy has resolved. PT/INR normal.    She is going to get lactulose by mouth tonight. Impression:    Mild hepatic encephalopathy. Mild coagulopathy resolved. Plan:    Await results of the chronic liver disease. Lactulose. Monitor pro time/INR and ammonia level. Discussed with Dr. Matilda Pearce earlier.
Jyothi Paz has called and stated that the patient has been accepted and will be going to the [address] [place] [address]. Dr. Angella Gleason and Te Gonzalez have been notified.
MEDICAL ONCOLOGY PROGRESS NOTE     Pt Name: Anson Olmstead  MRN: [id number]  YOB: 1950  Date of evaluation: 6/5/2023     subjective-afebrile. She denies any new complaints more. Denies any active bleeding. HISTORY OF PRESENT ILLNESS:  Davis Casarez was first seen by me on 6/3/2003. I was consulted for findings of hemolytic anemia pancytopenia. The patient presented as a transfer from outside facility. She was seen Alexia Alberto with complaints of nausea vomiting diarrhea. She was also found to be severely jaundiced. Work-up performed outside hospital showed hyperbilirubinemia with total bilirubin 35 with direct bilirubin 23. A CT abdomen/pelvis was performed and showed no acute intra-abdominal findings. In addition, findings of pancytopenia with microcytic anemia and thrombocytopenia. She was transferred to [facility] for further work-up. She has several medical morbidities including history of hypertension, SLE, seizures, dermatomyositis, history of skin cancer. She is followed by rheumatology here in [place] with Dr. Sheri Alvarado. She has been on methotrexate in the past but is currently on azathioprine daily. Outside labs showed elevated lactic acid 3.6, albumin 3.4, elevated anion gap, creatinine 1.0, carbon monoxide 28, direct bilirubin 23, total bilirubin 35, magnesium 2.3. AST 93, ALT 54, total protein 7.3. CBC showed platelet count 003,359, hemoglobin 10.8/, WBC 4.1  6/2/2023-CT abdomen/pelvis performed OSH, impression showed no acute abnormality within the abdomen or pelvis. Findings of chronic L4 compression fracture. Incidental finding of left adrenal nodule measuring 13 mm. No report about, bile duct obstruction or dilatation. Findings of prior cholecystectomy.   Work-up in the hospital as below        Past Medical History:    Past Medical History:   Diagnosis Date    Dermatomycosis     Hypokalemia     Neuropathy     Seizures ([address]        Past Surgical History:
Mikhail Luciano transferred to [facility] from [facility] via stretcher, air evac team    Reason for transfer: higher level of care   Explained reason for transfer to Patient and Family. Belongings: Glasses ,phone and small bag  with patient at bedside . Soft chart transferred with patient: Yes. Telemetry box number N/A transferred with patient: no.  Report given to: Mary Montanez, via telephone.       Electronically signed by Indiana Quintana RN on 6/8/2023 at 8:19 PM
Adrenal nodule measuring 30 mm. No report about bile duct obstruction or dilation. Findings of prior cholecystectomy. Assessment was pancytopenia and hemolytic anemia. 6/5/2023: Hematology oncology progress note: Plan: Transfuse for hemoglobin less than 7. Copper level, bone marrow biopsy, path review by pathology normal TPMT enzyme activity level. Hold azathioprine due to myelo suppression. Subjective     Patient's  at bedside. Patient had 3-4 BMs in the last 24 hours. Her mental status is unchanged. She knows the place. She knows the year. She knows the month. Does not know the date and day of the week. Does not know the president's name. She still has flapping tremors of both hands. Awaiting transfer to tertiary care facility. Discussed care with patient, her  and Dr. Joaquin Escalante. Patient had bone marrow examination. Results of the bone marrow examination is still pending. Afebrile. Heart rate and respiratory rate normal.  Last blood pressure 100/81. Oxygen saturation 95% on room air. Cord blood pressure was 98/56 yesterday. 6/7/2023: Sodium 133, potassium 3, BUN, creatinine and GFR normal.  Glucose 101. Calcium 8.5. Ammonia level 50 and normal.    WBC 2000, hemoglobin 7.6, , platelet count 92,851. Review of Systems   See above for pertinent Interval history and pertinent review of systems:    No family history liver disease. 6/2/23: Acute hepatitis A, B, C viral serology negative. 6/3/2023: Gallbladder ultrasound: Cholecystectomy. No biliary ductal dilation. Increased echogenicity of liver suggesting hepatic steatosis. 6/3/2023: Surgical pathology: Peripheral blood smear: Normochromic microcytic anemia. Normal WBC count and differential.  Mild thrombocytopenia. Rare schistocytes. Patient has macrocytic anemia and may benefit from V49 and folic acid supplementation.     Medications       Scheduled Meds:    lactulose  30 g Oral BID    sodium
Medication Dose Route Frequency Provider Last Rate Last Admin    lactulose (CHRONULAC) 10 GM/15ML solution 30 g  30 g Oral BID Franklyn Perla MD   30 g at 06/06/23 2131    silver sulfADIAZINE (SILVADENE) 1 % cream   Topical PRN Naomy Nieves, APRN - CNP        potassium chloride (KLOR-CON M) extended release tablet 40 mEq  40 mEq Oral PRN Hosea Fink MD        Or    potassium bicarb-citric acid (EFFER-K) effervescent tablet 40 mEq  40 mEq Oral PRN Hosea Fink MD        Or    potassium chloride 10 mEq/100 mL IVPB (Peripheral Line)  10 mEq IntraVENous PRN Hosea Fink MD        sodium chloride flush 0.9 % injection 5-40 mL  5-40 mL IntraVENous 2 times per day Hosea Fink MD   10 mL at 06/06/23 2131    sodium chloride flush 0.9 % injection 5-40 mL  5-40 mL IntraVENous PRN Hosea Fink MD        0.9 % sodium chloride infusion   IntraVENous PRN Hosea Fink MD        ondansetron (ZOFRAN-ODT) disintegrating tablet 4 mg  4 mg Oral Q8H PRN Hosea Fink MD        Or    ondansetron [facility] F) injection 4 mg  4 mg IntraVENous Q6H PRN Hosea Fink MD   4 mg at 06/03/23 0005    polyethylene glycol (GLYCOLAX) packet 17 g  17 g Oral Daily PRN Hosea Fink MD        melatonin disintegrating tablet 5 mg  5 mg Oral Nightly PRN Hosea Fink MD   5 mg at 06/03/23 2053    calcium carbonate (TUMS) chewable tablet 500 mg  500 mg Oral TID PRN Hosea Fink MD        lactated ringers IV soln infusion   IntraVENous Continuous Hosea Fink MD 66 mL/hr at 06/06/23 2200 New Bag at 06/06/23 2200    vitamin D (CHOLECALCIFEROL) capsule 5,000 Units  5,000 Units Oral Daily Hosea Fink MD   5,000 Units at 06/06/23 0854    estradiol (ESTRACE) vaginal cream 1 g  1 g Vaginal Once per day on Mon Thu Hosea Fink MD   1 g at 59/61/56 3592    folic acid (FOLVITE) tablet 1 mg  1 mg Oral BID Hosea Fink MD   1 mg at 06/06/23 2130    leflunomide (ARAVA) tablet 10 mg  10 mg Oral Daily Hosea Fink MD   10 mg at 06/05/23
replacement **OR** potassium chloride, sodium chloride flush, sodium chloride, ondansetron **OR** ondansetron, polyethylene glycol, melatonin, calcium carbonate, promethazine    Past History        Past Medical History:   has a past medical history of Dermatomycosis, Hypokalemia, Neuropathy, and Seizures ([address] Social History:   reports that she has never smoked. She has never used smokeless tobacco. She reports that she does not drink alcohol and does not use drugs. Family History:   Family History   Problem Relation Age of Onset    Kidney Disease Mother     Hypertension Mother     Stroke Mother     Heart Disease Father     Kidney Disease Brother     Heart Disease Brother     Heart Attack Brother     Heart Disease Maternal Uncle     Heart Attack Maternal Uncle     Muscular Dystrophy Paternal Uncle     Stroke Maternal Grandmother        Physical Examination                BP (!) 92/44   Pulse 75   Temp 97.3 °F (36.3 °C) (Temporal)   Resp 14   Ht 5' 6\" (1.676 m)   Wt 161 lb (73 kg)   SpO2 96%   BMI 25.99 kg/m²       Temp (24hrs), Av.8 °F (36.6 °C), Min:97.3 °F (36.3 °C), Max:98.1 °F (36.7 °C)        I/O (24Hr): Intake/Output Summary (Last 24 hours) at 2023 1300  Last data filed at 2023 1035  Gross per 24 hour   Intake 4195 ml   Output 400 ml   Net 3795 ml         Physical Exam      General: Conscious, slightly confused. Jaundice. HEENT: Normocephalic. Atraumatic. Jaundice. Neck: Supple. No JVD. Abdomen: Obese. Soft. Nontender. No gross organomegaly. No gross masses. Extremities: No cyanosis. Trace bilateral lower leg edema. Unable to test for asterixis since patient is not able to extend her arms. Skin: Skin is jaundiced. Neuro: Slightly confused.             Labs/Imaging/Diagnostics     Labs:    CBC:  Recent Labs     23  0223 23  1336 23  0227 23  022   WBC 3.3*  --  2.7* 3.1*   RBC 2.01*  --  2.02* 1.99*   HGB 8.2*
disease. Abnormal labs. Jaundice. TECHNIQUE: Sonography of the right upper quadrant was performed. Color Doppler imaging of the portal vein was performed. Images were obtained and stored in a permanent archive. COMPARISON: None appear  FINDINGS:  Pancreas: Largely obscured by overlying structures. Mid pancreas without gross abnormality. Liver: Mildly increased echogenicity. Decreased penetration of the ultrasound beam.  Left lobe not well visualized. .  Normal surface contour. No lesions. - Main portal vein: Normal hepatopetal flow. Biliary: Cholecystectomy. No biliary ductal dilatation. -Common bile duct measures 5.0 mm. Right Kidney: No mass, calculus, or hydronephrosis. IVC:  Obscured. Other: No ascites. Cholecystectomy without biliary ductal dilatation. Increased echogenicity of the liver suggesting hepatic steatosis. Limited exam with the pancreas and left liver obscured by overlying bowel gas.    ______________________________________ Electronically signed by: Noel Nichols M.D. Date:     06/03/2023 Time:    22:18         Assessment/Plan   Principal Problem:    Elevated bilirubin  Active Problems:    Lupus erythematosus    Seizure disorder (HCC)    Parkinson disease ([address]    Acute liver failure without hepatic coma    Abnormal LFTs    Pancytopenia (HCC)    Toxic liver disease with cholestasis    Drug-induced nonautoimmune hemolytic anemia ([address]  Resolved Problems:    * No resolved hospital problems. *      Principal Problem:    Elevated bilirubin   GI following   viral hepatitis panel negative   Ultrasound gallbladder suggestive of hepatic stereosis process  GI requests transfer to hepatologist for bx.   Transfer initiated  Active Problems:    Lupus erythematosus   Hold arava    Seizure disorder ([address]  Seizure precautions    Hold keppra   Parkinson disease ([address]   Not on any chronic Parkinson's medication   Rapid mouth  tremor noted  Pancytopenia   Oncology following     Bone marrow
follows commands, non focal       LABORATORY RESULTS REVIEWED/ANALYZED BY ME:  Recent Labs     06/08/23  0222 06/07/23  0255 06/06/23  0232   WBC 1.8* 2.0* 2.3*   HGB 8.0* 7.6* 7.9*   HCT 21.7* 20.8* 22.0*   .3* 109.5* 113.4*   PLT 26* 26* 29*     Lab Results   Component Value Date    NEUTROABS 1.2 (L) 06/08/2023         Lab Results   Component Value Date     06/08/2023    K 3.5 06/08/2023     06/08/2023    CO2 23 06/08/2023    BUN 12 06/08/2023    CREATININE 0.5 06/08/2023    GLUCOSE 94 06/08/2023    CALCIUM 8.3 (L) 06/08/2023    PROT 3.8 (L) 06/08/2023    LABALBU 2.3 (L) 06/08/2023    BILITOT 20.2 (H) 06/08/2023    ALKPHOS 166 (H) 06/08/2023    AST 48 (H) 06/08/2023    ALT 27 06/08/2023    LABGLOM >60 06/08/2023    GFRAA 49 (A) 12/13/2021       RADIOLOGY STUDIES REPORT/REVIEWED AND INTERPRETED BY ME:  Outside CT abdomen/pelvis performed 6/2/2023  -Unremarkable CT abdomen pelvis for any acute findings. No mention of CBD dilatation. Liver was read as normal.      US GALLBLADDER RUQ    Result Date: 6/3/2023  EXAM: ULTRASOUND OF THE RIGHT UPPER QUADRANT (LIMITED ABDOMEN)  HISTORY: Assess for hepatobiliary disease. Abnormal labs. Jaundice. TECHNIQUE: Sonography of the right upper quadrant was performed. Color Doppler imaging of the portal vein was performed. Images were obtained and stored in a permanent archive. COMPARISON: None appear  FINDINGS:  Pancreas: Largely obscured by overlying structures. Mid pancreas without gross abnormality. Liver: Mildly increased echogenicity. Decreased penetration of the ultrasound beam.  Left lobe not well visualized. .  Normal surface contour. No lesions. - Main portal vein: Normal hepatopetal flow. Biliary: Cholecystectomy. No biliary ductal dilatation. -Common bile duct measures 5.0 mm. Right Kidney: No mass, calculus, or hydronephrosis. IVC:  Obscured. Other: No ascites. Cholecystectomy without biliary ductal dilatation.   Increased
in a permanent archive. COMPARISON: None appear  FINDINGS:  Pancreas: Largely obscured by overlying structures. Mid pancreas without gross abnormality. Liver: Mildly increased echogenicity. Decreased penetration of the ultrasound beam.  Left lobe not well visualized. .  Normal surface contour. No lesions. - Main portal vein: Normal hepatopetal flow. Biliary: Cholecystectomy. No biliary ductal dilatation. -Common bile duct measures 5.0 mm. Right Kidney: No mass, calculus, or hydronephrosis. IVC:  Obscured. Other: No ascites. Cholecystectomy without biliary ductal dilatation. Increased echogenicity of the liver suggesting hepatic steatosis. Limited exam with the pancreas and left liver obscured by overlying bowel gas.    ______________________________________ Electronically signed by: Nelda Escobar M.D. Date:     06/03/2023 Time:    22:18         Assessment/Plan   Principal Problem:    Toxic liver disease with cholestasis  Active Problems:    Lupus erythematosus    Seizure disorder (HCC)    Parkinson disease (HCC)    Elevated bilirubin    Acute liver failure without hepatic coma    Abnormal LFTs    Pancytopenia (HCC)    Drug-induced nonautoimmune hemolytic anemia (HCC)    Hepatic encephalopathy (HCC)  Resolved Problems:    * No resolved hospital problems. *      Principal Problem:    Elevated bilirubin   GI following   viral hepatitis panel negative   Ultrasound gallbladder suggestive of hepatic stereosis process  GI requests transfer to hepatologist for bx. Transfer initiated   Autoimmune antiboies positive   Total IgG nl  Active Problems:    Lupus erythematosus   Hold arava    Seizure disorder ([address]  Seizure precautions    Hold keppra   Parkinson disease ([facility]   Not on any chronic Parkinson's medication   Rapid mouth  tremor noted  Pancytopenia   Oncology following     Bone marrow bx completed and resulted. Resolved Problems:    * No resolved hospital problems.  *      DVT Prophylaxis:
on EOB x 10 mins SBA  Transfers  Sit to Stand: Unable to assess  Stand to Sit: Unable to assess  Comment: pt having difficulty sitting on EOB, initially posterior lean and then forward lean, unable to hold rail on EOB; unable to scoot up on EOB. Pt. unable to prop hands on EOB/rail for gripping. Ambulation  Comments: unable to amb. due to weakness     Balance  Posture: Poor  Sitting - Static: Fair;-  Sitting - Dynamic: Poor;+  Exercise Treatment: AROM BU/LEs x 10 reps in supine        OutComes Score                                                  AM-PAC Score             Tinneti Score       Goals  Short Term Goals  Time Frame for Short Term Goals: 2 wks  Short Term Goal 1: supine to sit indep  Short Term Goal 2: sit to stand indep  Short Term Goal 3: amb. 100' with RW SBA  Short Term Goal 4: bed to chair SBA  Patient Goals   Patient Goals : get better       Education  Patient Education  Education Given To: Patient; Family  Education Provided: Role of Therapy;Plan of Care;Transfer Training  Education Provided Comments: use of call light, staff A  Education Method: Verbal;Demonstration  Barriers to Learning: Cognition;Vision (drowsy, hard to keep eyes open)  Education Outcome: Verbalized understanding;Continued education needed      Therapy Time   Individual Concurrent Group Co-treatment   Time In           Time Out           Minutes                   Joanie Barriga PT   Electronically signed by Joanie Barriga PT on 6/7/2023 at 12:47 PM
noted  Pancytopenia   Oncology following     Bone marrow biopsy planned for early next week  Resolved Problems:    * No resolved hospital problems. *      DVT Prophylaxis: platelets [id number]      Discharge planning: TBD       Further Orders per Clinical course/attending. Electronically signed by ARMINDA Akbar CNP on 6/7/2023 at 5:44 PM       EMR Dragon/Transcription disclaimer:   Much of this encounter note is an electronic transcription/translation of spoken language to printed text.  The electronic translation of spoken language may permit erroneous, or at times, nonsensical words or phrases to be inadvertently transcribed; although attempts have made to review the note for such errors, some may still exist.
abdomen pelvis. Work-up,  management and follow-up as per hospitalist medicine    Plan:        Genetic testing for hemochromatosis and repeat iron studies as outpatient basis. Discussed with DAVID Kilpatrick and Dr. Tony Schmid, hospitalist.    I and Vandana Eric discussed with Dr. Britt Proctor, hepatologist at [facility] earlier. Dr. Britt Proctor will assess the patient depending on bed availability. The results of send out tests  are still pending. Lactulose so that patient had 2-4 soft bowel movements daily. Monitor pro time, LFTs, ammonia level.
hepatitis serology negative for hepatitis A, hep B and hepatitis C on 6/2/2023. Possible implicating medications in this patient can be azathioprine, Keppra, leflunomide. Most likely patient's liver,is unable to handle increased unconjugated bilirubin and is unable to convert into bilirubin completely. Pancytopenia. Cause not clear. Elevated ferritin of 1761 but likely acute phase reactant. Cannot rule out hemochromatosis. Serum iron elevated 148.    13 Millimeter left adrenal nodule on CT scan of the abdomen pelvis. Work-up,  management and follow-up as per hospitalist medicine    Plan:        Genetic testing for hemochromatosis and repeat iron studies as outpatient basis. Awaiting transfer to a tertiary care center. Lactulose. Aim 2-4 soft bowel movements daily. Rifaximin. Monitor pro time, LFTs, ammonia level.
my additional contributions to the history of present illness, physical examination, and assessment/medical decision-making that reflect my findings and impressions. I have seen and examined the patient and the key elements of all parts of the encounter have been performed by me. I agree with the assessment and plan as outlined by the ARNP/PA. Subjective-continues to complain of significant weakness. Denies any active bleeding  Objective-jaundiced. Chronically ill-appearing  Assessment/plan:  Bicytopenia-worsening thrombocytopenia. Rigoberto negative hemolytic anemia.   Hemoglobin slightly lower  Bone marrow biopsy today by Denzel Ferguson  Follow-up results of [id number]%, normal  [facility] flow cytometry-in process  -TPMT enzyme activity level, in process  Ida Lim MD

## 2023-06-11 LAB
Lab: NORMAL
REPORT: NORMAL
THIS TEST SENT TO: NORMAL

## 2023-07-18 RX ORDER — LEVETIRACETAM 500 MG/1
TABLET ORAL
Qty: 270 TABLET | Refills: 5 | Status: SHIPPED | OUTPATIENT
Start: 2023-07-18

## 2023-07-18 NOTE — TELEPHONE ENCOUNTER
Requested Prescriptions     Pending Prescriptions Disp Refills    levETIRAcetam (KEPPRA) 500 MG tablet [Pharmacy Med Name: LEVETIRACETAM 500 MG Tablet] 270 tablet 5     Sig: TAKE 1 AND 1/2 TABLETS TWICE DAILY       Last Office Visit: 8/15/2022  Next Office Visit: Visit date not found  Last Medication Refill: 5/10/2022 with 5 RF

## 2023-10-17 ENCOUNTER — TELEPHONE (OUTPATIENT)
Dept: NEUROSURGERY | Age: 73
End: 2023-10-17

## 2023-10-17 NOTE — TELEPHONE ENCOUNTER
Pt called, was in ICU for 8 weeks, and then in rehab, it was related to her kidney, and just wanted to let him know , she had been through. Please reach if records are need she was at [facility] for 8 weeks then [facility] for rehab, thanks !

## 2023-11-07 ENCOUNTER — OFFICE VISIT (OUTPATIENT)
Dept: NEUROLOGY | Age: 73
End: 2023-11-07
Payer: MEDICARE

## 2023-11-07 VITALS
HEART RATE: 67 BPM | SYSTOLIC BLOOD PRESSURE: 118 MMHG | WEIGHT: 161 LBS | HEIGHT: 66 IN | DIASTOLIC BLOOD PRESSURE: 79 MMHG | BODY MASS INDEX: 25.88 KG/M2

## 2023-11-07 DIAGNOSIS — G89.29 CHRONIC BILATERAL LOW BACK PAIN WITHOUT SCIATICA: ICD-10-CM

## 2023-11-07 DIAGNOSIS — R42 VERTIGO: ICD-10-CM

## 2023-11-07 DIAGNOSIS — G40.909 SEIZURE DISORDER (HCC): Primary | ICD-10-CM

## 2023-11-07 DIAGNOSIS — G62.9 POLYNEUROPATHY: ICD-10-CM

## 2023-11-07 DIAGNOSIS — M54.50 CHRONIC BILATERAL LOW BACK PAIN WITHOUT SCIATICA: ICD-10-CM

## 2023-11-07 PROCEDURE — 1090F PRES/ABSN URINE INCON ASSESS: CPT | Performed by: PSYCHIATRY & NEUROLOGY

## 2023-11-07 PROCEDURE — 3017F COLORECTAL CA SCREEN DOC REV: CPT | Performed by: PSYCHIATRY & NEUROLOGY

## 2023-11-07 PROCEDURE — 3078F DIAST BP <80 MM HG: CPT | Performed by: PSYCHIATRY & NEUROLOGY

## 2023-11-07 PROCEDURE — 99214 OFFICE O/P EST MOD 30 MIN: CPT | Performed by: PSYCHIATRY & NEUROLOGY

## 2023-11-07 PROCEDURE — G8399 PT W/DXA RESULTS DOCUMENT: HCPCS | Performed by: PSYCHIATRY & NEUROLOGY

## 2023-11-07 PROCEDURE — 3074F SYST BP LT 130 MM HG: CPT | Performed by: PSYCHIATRY & NEUROLOGY

## 2023-11-07 PROCEDURE — 1036F TOBACCO NON-USER: CPT | Performed by: PSYCHIATRY & NEUROLOGY

## 2023-11-07 PROCEDURE — G8417 CALC BMI ABV UP PARAM F/U: HCPCS | Performed by: PSYCHIATRY & NEUROLOGY

## 2023-11-07 PROCEDURE — G8484 FLU IMMUNIZE NO ADMIN: HCPCS | Performed by: PSYCHIATRY & NEUROLOGY

## 2023-11-07 PROCEDURE — 1124F ACP DISCUSS-NO DSCNMKR DOCD: CPT | Performed by: PSYCHIATRY & NEUROLOGY

## 2023-11-07 PROCEDURE — G8427 DOCREV CUR MEDS BY ELIG CLIN: HCPCS | Performed by: PSYCHIATRY & NEUROLOGY

## 2023-11-07 RX ORDER — POTASSIUM CHLORIDE 20 MEQ/1
20 TABLET, EXTENDED RELEASE ORAL 2 TIMES DAILY
COMMUNITY
Start: 2023-10-08

## 2023-11-07 NOTE — PROGRESS NOTES
as well. GI following. 4.  Follow up with neurosurgery for back pain. 5.  Consider C-spine imaging, NCS/EMG RUE if worsens, follow for now. 6.  Follow neuropathy, on Lyrica  7. Follow tremor, no clear overt suggestion of parkinsonism on exam, but will follow.        Lia Avitia,   Board Certified Neurology

## 2024-05-07 ENCOUNTER — OFFICE VISIT (OUTPATIENT)
Dept: NEUROLOGY | Age: 74
End: 2024-05-07
Payer: MEDICARE

## 2024-05-07 VITALS
HEART RATE: 88 BPM | DIASTOLIC BLOOD PRESSURE: 74 MMHG | OXYGEN SATURATION: 97 % | WEIGHT: 161 LBS | HEIGHT: 66 IN | BODY MASS INDEX: 25.88 KG/M2 | SYSTOLIC BLOOD PRESSURE: 124 MMHG

## 2024-05-07 DIAGNOSIS — G89.29 CHRONIC BILATERAL LOW BACK PAIN WITHOUT SCIATICA: ICD-10-CM

## 2024-05-07 DIAGNOSIS — R42 VERTIGO: ICD-10-CM

## 2024-05-07 DIAGNOSIS — G40.909 SEIZURE DISORDER (HCC): Primary | ICD-10-CM

## 2024-05-07 DIAGNOSIS — M54.50 CHRONIC BILATERAL LOW BACK PAIN WITHOUT SCIATICA: ICD-10-CM

## 2024-05-07 DIAGNOSIS — G62.9 POLYNEUROPATHY: ICD-10-CM

## 2024-05-07 PROCEDURE — 1090F PRES/ABSN URINE INCON ASSESS: CPT | Performed by: PSYCHIATRY & NEUROLOGY

## 2024-05-07 PROCEDURE — G8417 CALC BMI ABV UP PARAM F/U: HCPCS | Performed by: PSYCHIATRY & NEUROLOGY

## 2024-05-07 PROCEDURE — 1036F TOBACCO NON-USER: CPT | Performed by: PSYCHIATRY & NEUROLOGY

## 2024-05-07 PROCEDURE — 99214 OFFICE O/P EST MOD 30 MIN: CPT | Performed by: PSYCHIATRY & NEUROLOGY

## 2024-05-07 PROCEDURE — 3017F COLORECTAL CA SCREEN DOC REV: CPT | Performed by: PSYCHIATRY & NEUROLOGY

## 2024-05-07 PROCEDURE — G8427 DOCREV CUR MEDS BY ELIG CLIN: HCPCS | Performed by: PSYCHIATRY & NEUROLOGY

## 2024-05-07 PROCEDURE — 1124F ACP DISCUSS-NO DSCNMKR DOCD: CPT | Performed by: PSYCHIATRY & NEUROLOGY

## 2024-05-07 PROCEDURE — G8399 PT W/DXA RESULTS DOCUMENT: HCPCS | Performed by: PSYCHIATRY & NEUROLOGY

## 2024-05-07 PROCEDURE — 3074F SYST BP LT 130 MM HG: CPT | Performed by: PSYCHIATRY & NEUROLOGY

## 2024-05-07 PROCEDURE — 3078F DIAST BP <80 MM HG: CPT | Performed by: PSYCHIATRY & NEUROLOGY

## 2024-05-07 RX ORDER — LEVETIRACETAM 500 MG/1
TABLET ORAL
Qty: 150 TABLET | Refills: 11 | Status: SHIPPED | OUTPATIENT
Start: 2024-05-07

## 2024-05-07 RX ORDER — DULOXETIN HYDROCHLORIDE 20 MG/1
20 CAPSULE, DELAYED RELEASE ORAL DAILY
COMMUNITY
Start: 2024-04-18

## 2024-05-07 NOTE — PROGRESS NOTES
IMAGING REVIEW (As below and per HPI)    Records reviewed.     EEG intermittent left parasagittal spike discharges.  Repeat EEG with asynchronous frontally predominant spike discharges.     MRI largely negative.    NCS/EMG BLE c/w polyneuropathy.     CBC, CMP ok outside of mild leukopenia likely imuran related, Keppra level slightly high, no side effect noted.           ASSESSMENT:    Carolina Hoskins is a 73 y.o. year old female here for seizure disorder, vertigo, back pain, polyneuropathy, tremor.  Noting some possible breakthrough events since last seen. Vertigo, orthostasis and right upper extremity numbness unchanged. EEG is abnormal.  Prior repeat MRI largely negative. Underlying Dermatomyositis history noted as well.   Back pain unchanged, neurosurgery is following. Question mild subtle parkinsonism, possibly mildly progressing since last seen.     PLAN:  1.  Increase Keppra 1000/1500.   2.  Epilepsy precautions and seizure first aid discussed.  Driving per [place] state law.  No heights, swimming, tub baths, open flames, or heavy machinery.    3.  Follow up with cardiology as directed, blood pressure medication adjustments through primary for orthostatic symptoms, follow up with Rheumatology as directed as well for lupus possible dermatomyositis.  Oncology has evaluated as well for pancytopenia, now off Imuran. Felt possible source for liver abnormalities as well.  GI following.   4.  Follow up with neurosurgery for back pain.   5.  Consider C-spine imaging, NCS/EMG RUE if worsens, follow for now.   6.  Follow neuropathy, on Lyrica, recently started Cymbalta   7.  Follow tremor, question mild parkinsonism, will follow.       Raúl Laura,   Board Certified Neurology

## 2024-05-13 RX ORDER — DULOXETIN HYDROCHLORIDE 20 MG/1
20 CAPSULE, DELAYED RELEASE ORAL DAILY
Qty: 90 CAPSULE | Refills: 3 | Status: SHIPPED | OUTPATIENT
Start: 2024-05-13

## 2024-05-13 NOTE — TELEPHONE ENCOUNTER
Refill request was sent over but I do not see that we give her this medication. Your note says she was recently started on Cymbalta but I wasn't sure if you were taking over filling it or not?

## 2024-08-12 NOTE — TELEPHONE ENCOUNTER
Requested Prescriptions     Pending Prescriptions Disp Refills    levETIRAcetam (KEPPRA) 500 MG tablet [Pharmacy Med Name: LEVETIRACETAM 500 MG Tablet] 360 tablet 3     Sig: TAKE 2 TABLETS TWICE A DAY       Last Office Visit: 8/15/2022  Next Office Visit: Visit date not found  Last Medication Refill: 5/7/2024 with 11 RF

## 2024-08-13 RX ORDER — LEVETIRACETAM 500 MG/1
TABLET ORAL
Qty: 360 TABLET | Refills: 3 | Status: SHIPPED | OUTPATIENT
Start: 2024-08-13

## 2024-09-17 ENCOUNTER — OFFICE VISIT (OUTPATIENT)
Dept: NEUROLOGY | Age: 74
End: 2024-09-17
Payer: MEDICARE

## 2024-09-17 VITALS
DIASTOLIC BLOOD PRESSURE: 68 MMHG | WEIGHT: 161 LBS | SYSTOLIC BLOOD PRESSURE: 118 MMHG | OXYGEN SATURATION: 97 % | BODY MASS INDEX: 25.88 KG/M2 | HEART RATE: 78 BPM | HEIGHT: 66 IN

## 2024-09-17 DIAGNOSIS — G62.9 POLYNEUROPATHY: ICD-10-CM

## 2024-09-17 DIAGNOSIS — G40.909 SEIZURE DISORDER (HCC): Primary | ICD-10-CM

## 2024-09-17 DIAGNOSIS — M54.50 CHRONIC BILATERAL LOW BACK PAIN WITHOUT SCIATICA: ICD-10-CM

## 2024-09-17 DIAGNOSIS — R42 VERTIGO: ICD-10-CM

## 2024-09-17 DIAGNOSIS — G89.29 CHRONIC BILATERAL LOW BACK PAIN WITHOUT SCIATICA: ICD-10-CM

## 2024-09-17 PROCEDURE — 1090F PRES/ABSN URINE INCON ASSESS: CPT | Performed by: PSYCHIATRY & NEUROLOGY

## 2024-09-17 PROCEDURE — G8417 CALC BMI ABV UP PARAM F/U: HCPCS | Performed by: PSYCHIATRY & NEUROLOGY

## 2024-09-17 PROCEDURE — 3074F SYST BP LT 130 MM HG: CPT | Performed by: PSYCHIATRY & NEUROLOGY

## 2024-09-17 PROCEDURE — 3078F DIAST BP <80 MM HG: CPT | Performed by: PSYCHIATRY & NEUROLOGY

## 2024-09-17 PROCEDURE — 1036F TOBACCO NON-USER: CPT | Performed by: PSYCHIATRY & NEUROLOGY

## 2024-09-17 PROCEDURE — G8427 DOCREV CUR MEDS BY ELIG CLIN: HCPCS | Performed by: PSYCHIATRY & NEUROLOGY

## 2024-09-17 PROCEDURE — 3017F COLORECTAL CA SCREEN DOC REV: CPT | Performed by: PSYCHIATRY & NEUROLOGY

## 2024-09-17 PROCEDURE — 1124F ACP DISCUSS-NO DSCNMKR DOCD: CPT | Performed by: PSYCHIATRY & NEUROLOGY

## 2024-09-17 PROCEDURE — 99214 OFFICE O/P EST MOD 30 MIN: CPT | Performed by: PSYCHIATRY & NEUROLOGY

## 2024-09-17 PROCEDURE — G8399 PT W/DXA RESULTS DOCUMENT: HCPCS | Performed by: PSYCHIATRY & NEUROLOGY

## 2024-12-09 ENCOUNTER — TELEPHONE (OUTPATIENT)
Dept: NEUROLOGY | Age: 74
End: 2024-12-09

## 2024-12-09 NOTE — TELEPHONE ENCOUNTER
Patient had a fall last Monday and hit her head on a cedar chest and states that she has been dizzy and had a headache ever since. Patient states that she doesn't have a knot but would like to speak to Dr. Laura nurse about it.

## 2024-12-09 NOTE — TELEPHONE ENCOUNTER
Called patient back to see if she went to the ED after her fall into the cedar chest. Patient stated that she did not go felt that she didn't need it. Still having severe dizziness. I was able to get patient scheduled to be seen with Dr Laura for Tuesday 12- @ 1:45  Patient voiced understanding

## 2024-12-10 ENCOUNTER — OFFICE VISIT (OUTPATIENT)
Dept: NEUROLOGY | Age: 74
End: 2024-12-10
Payer: MEDICARE

## 2024-12-10 VITALS
DIASTOLIC BLOOD PRESSURE: 76 MMHG | OXYGEN SATURATION: 97 % | HEART RATE: 68 BPM | BODY MASS INDEX: 25.88 KG/M2 | WEIGHT: 161 LBS | SYSTOLIC BLOOD PRESSURE: 122 MMHG | HEIGHT: 66 IN

## 2024-12-10 DIAGNOSIS — G62.9 POLYNEUROPATHY: ICD-10-CM

## 2024-12-10 DIAGNOSIS — G40.909 SEIZURE DISORDER (HCC): ICD-10-CM

## 2024-12-10 DIAGNOSIS — G40.909 SEIZURE DISORDER (HCC): Primary | ICD-10-CM

## 2024-12-10 DIAGNOSIS — R53.83 OTHER FATIGUE: ICD-10-CM

## 2024-12-10 DIAGNOSIS — M54.50 CHRONIC BILATERAL LOW BACK PAIN WITHOUT SCIATICA: ICD-10-CM

## 2024-12-10 DIAGNOSIS — R42 VERTIGO: ICD-10-CM

## 2024-12-10 DIAGNOSIS — G89.29 CHRONIC BILATERAL LOW BACK PAIN WITHOUT SCIATICA: ICD-10-CM

## 2024-12-10 LAB
ALBUMIN SERPL-MCNC: 4.4 G/DL (ref 3.5–5.2)
ALP SERPL-CCNC: 108 U/L (ref 35–104)
ALT SERPL-CCNC: 32 U/L (ref 5–33)
ANION GAP SERPL CALCULATED.3IONS-SCNC: 12 MMOL/L (ref 7–19)
AST SERPL-CCNC: 40 U/L (ref 5–32)
BASOPHILS # BLD: 0 K/UL (ref 0–0.2)
BASOPHILS NFR BLD: 0.4 % (ref 0–1)
BILIRUB SERPL-MCNC: 0.3 MG/DL (ref 0.2–1.2)
BUN SERPL-MCNC: 15 MG/DL (ref 8–23)
CALCIUM SERPL-MCNC: 9.7 MG/DL (ref 8.8–10.2)
CHLORIDE SERPL-SCNC: 100 MMOL/L (ref 98–111)
CK SERPL-CCNC: 55 U/L (ref 26–192)
CO2 SERPL-SCNC: 27 MMOL/L (ref 22–29)
CREAT SERPL-MCNC: 1.1 MG/DL (ref 0.5–0.9)
EOSINOPHIL # BLD: 0.2 K/UL (ref 0–0.6)
EOSINOPHIL NFR BLD: 3.1 % (ref 0–5)
ERYTHROCYTE [DISTWIDTH] IN BLOOD BY AUTOMATED COUNT: 11.7 % (ref 11.5–14.5)
GLUCOSE SERPL-MCNC: 81 MG/DL (ref 70–99)
HCT VFR BLD AUTO: 44.8 % (ref 37–47)
HGB BLD-MCNC: 14.5 G/DL (ref 12–16)
IMM GRANULOCYTES # BLD: 0 K/UL
LYMPHOCYTES # BLD: 1.7 K/UL (ref 1.1–4.5)
LYMPHOCYTES NFR BLD: 23.2 % (ref 20–40)
MCH RBC QN AUTO: 31.6 PG (ref 27–31)
MCHC RBC AUTO-ENTMCNC: 32.4 G/DL (ref 33–37)
MCV RBC AUTO: 97.6 FL (ref 81–99)
MONOCYTES # BLD: 0.5 K/UL (ref 0–0.9)
MONOCYTES NFR BLD: 6.7 % (ref 0–10)
NEUTROPHILS # BLD: 5 K/UL (ref 1.5–7.5)
NEUTS SEG NFR BLD: 66.3 % (ref 50–65)
PLATELET # BLD AUTO: 319 K/UL (ref 130–400)
PMV BLD AUTO: 10 FL (ref 9.4–12.3)
POTASSIUM SERPL-SCNC: 3.8 MMOL/L (ref 3.5–5)
PROT SERPL-MCNC: 8.1 G/DL (ref 6.4–8.3)
RBC # BLD AUTO: 4.59 M/UL (ref 4.2–5.4)
SODIUM SERPL-SCNC: 139 MMOL/L (ref 136–145)
T4 FREE SERPL-MCNC: 1.09 NG/DL (ref 0.93–1.7)
TSH SERPL DL<=0.005 MIU/L-ACNC: 2.8 UIU/ML (ref 0.27–4.2)
VIT B12 SERPL-MCNC: 625 PG/ML (ref 232–1245)
WBC # BLD AUTO: 7.5 K/UL (ref 4.8–10.8)

## 2024-12-10 PROCEDURE — 1160F RVW MEDS BY RX/DR IN RCRD: CPT | Performed by: PSYCHIATRY & NEUROLOGY

## 2024-12-10 PROCEDURE — 1159F MED LIST DOCD IN RCRD: CPT | Performed by: PSYCHIATRY & NEUROLOGY

## 2024-12-10 PROCEDURE — 3078F DIAST BP <80 MM HG: CPT | Performed by: PSYCHIATRY & NEUROLOGY

## 2024-12-10 PROCEDURE — 1036F TOBACCO NON-USER: CPT | Performed by: PSYCHIATRY & NEUROLOGY

## 2024-12-10 PROCEDURE — G8484 FLU IMMUNIZE NO ADMIN: HCPCS | Performed by: PSYCHIATRY & NEUROLOGY

## 2024-12-10 PROCEDURE — 1124F ACP DISCUSS-NO DSCNMKR DOCD: CPT | Performed by: PSYCHIATRY & NEUROLOGY

## 2024-12-10 PROCEDURE — G8417 CALC BMI ABV UP PARAM F/U: HCPCS | Performed by: PSYCHIATRY & NEUROLOGY

## 2024-12-10 PROCEDURE — 3074F SYST BP LT 130 MM HG: CPT | Performed by: PSYCHIATRY & NEUROLOGY

## 2024-12-10 PROCEDURE — G8427 DOCREV CUR MEDS BY ELIG CLIN: HCPCS | Performed by: PSYCHIATRY & NEUROLOGY

## 2024-12-10 PROCEDURE — 99214 OFFICE O/P EST MOD 30 MIN: CPT | Performed by: PSYCHIATRY & NEUROLOGY

## 2024-12-10 PROCEDURE — 3017F COLORECTAL CA SCREEN DOC REV: CPT | Performed by: PSYCHIATRY & NEUROLOGY

## 2024-12-10 PROCEDURE — 1090F PRES/ABSN URINE INCON ASSESS: CPT | Performed by: PSYCHIATRY & NEUROLOGY

## 2024-12-10 PROCEDURE — G8400 PT W/DXA NO RESULTS DOC: HCPCS | Performed by: PSYCHIATRY & NEUROLOGY

## 2024-12-10 NOTE — PROGRESS NOTES
[facility] Office Note      Patient:   Carolina Hoskins  MR#:    [id number]  Account Number:                         YOB: 1950  Date of Evaluation:  12/10/2024  Time of Note:                          2:21 PM  Primary/Referring Physician:  Wilner Santamaria DO  Consulting Physician:  Raúl Laura DO    FOLLOW UP VISIT      Chief Complaint   Patient presents with    Follow-up     Sooner follow up     Seizures    Fall     Patient fell last Monday hit her head on her cedar chest has been really dizzy ever since. Did not go to the ED    Dizziness       HISTORY OF PRESENT ILLNESS    Carolina Hoskins is a 74 y.o. year old female here for possible seizures.  Doing better no further events, Keppra increased previously.  Previously admitted for liver disease. Prior events described as feeling unusual, an atypical sensation, followed by a ALANNAH, possible GTC event, patient does not recall the events. No tongue biting.  No incontinence.  Vertigo noted as well, unchanged.   Some possible orthostatic symptoms as well. No chest pain or palpitations.    No history of TBI, meningitis, or encephalitis. No family history of seizures.  She has had a cardiac monitor. No facial drooping, slurred speech, no focal weakness or numbness.  No prior stroke history.  No prior seizure history. Cardiology is following.  CD, ECHO previously completed and was largely negative.  CT head negative per patient.  She is not on seizure medication currently.  Dermatomyositis history noted.  Followed by Dr. Nichols, was on MTX and has been stopped since the episode.  Blood pressure medications adjusted for possible orthostatic symptoms, but still noted.  EEG abnormal, on Keppra.  No overt side effects.  MRI with nothing acute.  Back pain noted, neurosurgery is following.  Noting some possible right upper extremity numbness intermittently as well, unchanged.  Prior repeat MRI negative.  Noting more tremor at times and some

## 2024-12-18 ENCOUNTER — HOSPITAL ENCOUNTER (OUTPATIENT)
Dept: MRI IMAGING | Age: 74
Discharge: HOME | End: 2024-12-18
Attending: PSYCHIATRY & NEUROLOGY
Payer: MEDICARE

## 2024-12-18 DIAGNOSIS — M54.50 CHRONIC BILATERAL LOW BACK PAIN WITHOUT SCIATICA: ICD-10-CM

## 2024-12-18 DIAGNOSIS — G89.29 CHRONIC BILATERAL LOW BACK PAIN WITHOUT SCIATICA: ICD-10-CM

## 2024-12-18 DIAGNOSIS — G62.9 POLYNEUROPATHY: ICD-10-CM

## 2024-12-18 DIAGNOSIS — G40.909 SEIZURE DISORDER (HCC): ICD-10-CM

## 2024-12-18 DIAGNOSIS — R42 VERTIGO: ICD-10-CM

## 2024-12-18 LAB
CREAT SERPL-MCNC: 0.9 MG/DL (ref 0.3–1.3)
PERFORMED ON: NORMAL

## 2024-12-18 PROCEDURE — A9577 INJ MULTIHANCE: HCPCS | Performed by: PSYCHIATRY & NEUROLOGY

## 2024-12-18 PROCEDURE — 70553 MRI BRAIN STEM W/O & W/DYE: CPT

## 2024-12-18 PROCEDURE — 6360000004 HC RX CONTRAST MEDICATION: Performed by: PSYCHIATRY & NEUROLOGY

## 2024-12-18 PROCEDURE — 82565 ASSAY OF CREATININE: CPT

## 2024-12-18 RX ADMIN — GADOBENATE DIMEGLUMINE 15 ML: 529 INJECTION, SOLUTION INTRAVENOUS at 12:05

## 2025-03-18 ENCOUNTER — OFFICE VISIT (OUTPATIENT)
Dept: NEUROLOGY | Age: 75
End: 2025-03-18
Payer: MEDICARE

## 2025-03-18 VITALS
OXYGEN SATURATION: 97 % | HEIGHT: 66 IN | DIASTOLIC BLOOD PRESSURE: 70 MMHG | BODY MASS INDEX: 25.88 KG/M2 | WEIGHT: 161 LBS | SYSTOLIC BLOOD PRESSURE: 118 MMHG | HEART RATE: 72 BPM

## 2025-03-18 DIAGNOSIS — G40.909 SEIZURE DISORDER (HCC): Primary | ICD-10-CM

## 2025-03-18 DIAGNOSIS — R42 VERTIGO: ICD-10-CM

## 2025-03-18 DIAGNOSIS — M54.50 CHRONIC BILATERAL LOW BACK PAIN WITHOUT SCIATICA: ICD-10-CM

## 2025-03-18 DIAGNOSIS — G89.29 CHRONIC BILATERAL LOW BACK PAIN WITHOUT SCIATICA: ICD-10-CM

## 2025-03-18 DIAGNOSIS — G62.9 POLYNEUROPATHY: ICD-10-CM

## 2025-03-18 PROCEDURE — G8427 DOCREV CUR MEDS BY ELIG CLIN: HCPCS | Performed by: PSYCHIATRY & NEUROLOGY

## 2025-03-18 PROCEDURE — 99214 OFFICE O/P EST MOD 30 MIN: CPT | Performed by: PSYCHIATRY & NEUROLOGY

## 2025-03-18 PROCEDURE — G8417 CALC BMI ABV UP PARAM F/U: HCPCS | Performed by: PSYCHIATRY & NEUROLOGY

## 2025-03-18 PROCEDURE — G8400 PT W/DXA NO RESULTS DOC: HCPCS | Performed by: PSYCHIATRY & NEUROLOGY

## 2025-03-18 PROCEDURE — 3074F SYST BP LT 130 MM HG: CPT | Performed by: PSYCHIATRY & NEUROLOGY

## 2025-03-18 PROCEDURE — 1124F ACP DISCUSS-NO DSCNMKR DOCD: CPT | Performed by: PSYCHIATRY & NEUROLOGY

## 2025-03-18 PROCEDURE — 1159F MED LIST DOCD IN RCRD: CPT | Performed by: PSYCHIATRY & NEUROLOGY

## 2025-03-18 PROCEDURE — 1090F PRES/ABSN URINE INCON ASSESS: CPT | Performed by: PSYCHIATRY & NEUROLOGY

## 2025-03-18 PROCEDURE — 3017F COLORECTAL CA SCREEN DOC REV: CPT | Performed by: PSYCHIATRY & NEUROLOGY

## 2025-03-18 PROCEDURE — 1036F TOBACCO NON-USER: CPT | Performed by: PSYCHIATRY & NEUROLOGY

## 2025-03-18 PROCEDURE — 1160F RVW MEDS BY RX/DR IN RCRD: CPT | Performed by: PSYCHIATRY & NEUROLOGY

## 2025-03-18 PROCEDURE — 3078F DIAST BP <80 MM HG: CPT | Performed by: PSYCHIATRY & NEUROLOGY

## 2025-03-18 RX ORDER — ZOLEDRONIC ACID 0.05 MG/ML
INJECTION, SOLUTION INTRAVENOUS
COMMUNITY
End: 2025-03-18 | Stop reason: ALTCHOICE

## 2025-03-18 RX ORDER — LEVETIRACETAM 500 MG/1
TABLET ORAL
Qty: 450 TABLET | Refills: 3 | Status: SHIPPED | OUTPATIENT
Start: 2025-03-18

## 2025-03-18 NOTE — PROGRESS NOTES
spike discharges.  Repeat EEG with asynchronous frontally predominant spike discharges.     MRI largely negative.    NCS/EMG BLE c/w polyneuropathy.     CBC, CMP ok outside of mild leukopenia likely imuran related, Keppra level slightly high, no side effect noted.           ASSESSMENT:    Carolina Hoskins is a 74 y.o. year old female here for seizure disorder, vertigo, back pain, polyneuropathy, tremor.  No events since last seen.  Vertigo, orthostasis and right upper extremity numbness unchanged. EEG is abnormal.  Prior repeat MRI largely negative. Underlying Dermatomyositis history noted as well.   Back pain unchanged, neurosurgery is following. Question mild subtle parkinsonism, no overt progressing since last seen. No further falls. MRI brain largely negative, concussion symptoms much improved.  Labs negative outside of mild elevation in Cr, mild LFT abnormalities.      PLAN:  1.  Continue Keppra at 1000/1500.   2.  Epilepsy precautions and seizure first aid discussed.  Driving per [place] state law.  No heights, swimming, tub baths, open flames, or heavy machinery.    3.  Follow up with cardiology as directed, blood pressure medication adjustments through primary for orthostatic symptoms, follow up with Rheumatology as directed as well for lupus possible dermatomyositis.  Oncology has evaluated as well for pancytopenia, now off Imuran. Felt possible source for liver abnormalities as well.  GI following.   4.  Follow up with neurosurgery for back pain.   5.  Consider C-spine imaging, NCS/EMG RUE if worsens, follow for now.   6.  Follow neuropathy, off Lyrica, on Cymbalta   7.  Follow tremor, question mild parkinsonism, will follow.   8.  Follow up with PCP for mild Cr elevation, mild LFT abnormalities.       Raúl Laura,   Board Certified Neurology

## 2025-04-16 RX ORDER — DULOXETIN HYDROCHLORIDE 20 MG/1
20 CAPSULE, DELAYED RELEASE ORAL DAILY
Qty: 90 CAPSULE | Refills: 3 | Status: SHIPPED | OUTPATIENT
Start: 2025-04-16

## 2025-04-16 NOTE — TELEPHONE ENCOUNTER
Carolina Hoskins has requested a refill on her medication.      Last office visit : 3/18/2025   Next office visit : 9/15/2025   Last medication refill : 05/13/2024 #90 R3      Requested Prescriptions     Pending Prescriptions Disp Refills    DULoxetine (CYMBALTA) 20 MG extended release capsule [Pharmacy Med Name: DULoxetine HCl Oral Capsule Delayed Release Particles 20 MG] 90 capsule 3     Sig: TAKE 1 CAPSULE EVERY DAY

## 2025-05-07 ENCOUNTER — TELEPHONE (OUTPATIENT)
Dept: NEUROLOGY | Age: 75
End: 2025-05-07

## 2025-05-07 NOTE — TELEPHONE ENCOUNTER
Tried to reach patient to get her scheduled with Dr Laura to discuss medications, her vm is not set up at this time.   Wanted to offer her a follow up for 5-

## 2025-05-14 ENCOUNTER — OFFICE VISIT (OUTPATIENT)
Dept: NEUROLOGY | Age: 75
End: 2025-05-14
Payer: MEDICARE

## 2025-05-14 VITALS
HEIGHT: 66 IN | OXYGEN SATURATION: 97 % | SYSTOLIC BLOOD PRESSURE: 118 MMHG | WEIGHT: 161 LBS | BODY MASS INDEX: 25.88 KG/M2 | DIASTOLIC BLOOD PRESSURE: 74 MMHG | HEART RATE: 76 BPM

## 2025-05-14 DIAGNOSIS — G89.29 CHRONIC BILATERAL LOW BACK PAIN WITHOUT SCIATICA: ICD-10-CM

## 2025-05-14 DIAGNOSIS — M54.50 CHRONIC BILATERAL LOW BACK PAIN WITHOUT SCIATICA: ICD-10-CM

## 2025-05-14 DIAGNOSIS — R42 VERTIGO: ICD-10-CM

## 2025-05-14 DIAGNOSIS — G40.909 SEIZURE DISORDER (HCC): Primary | ICD-10-CM

## 2025-05-14 DIAGNOSIS — G62.9 POLYNEUROPATHY: ICD-10-CM

## 2025-05-14 PROCEDURE — 1160F RVW MEDS BY RX/DR IN RCRD: CPT | Performed by: PSYCHIATRY & NEUROLOGY

## 2025-05-14 PROCEDURE — 3017F COLORECTAL CA SCREEN DOC REV: CPT | Performed by: PSYCHIATRY & NEUROLOGY

## 2025-05-14 PROCEDURE — G8427 DOCREV CUR MEDS BY ELIG CLIN: HCPCS | Performed by: PSYCHIATRY & NEUROLOGY

## 2025-05-14 PROCEDURE — 1036F TOBACCO NON-USER: CPT | Performed by: PSYCHIATRY & NEUROLOGY

## 2025-05-14 PROCEDURE — 1124F ACP DISCUSS-NO DSCNMKR DOCD: CPT | Performed by: PSYCHIATRY & NEUROLOGY

## 2025-05-14 PROCEDURE — 3078F DIAST BP <80 MM HG: CPT | Performed by: PSYCHIATRY & NEUROLOGY

## 2025-05-14 PROCEDURE — G8400 PT W/DXA NO RESULTS DOC: HCPCS | Performed by: PSYCHIATRY & NEUROLOGY

## 2025-05-14 PROCEDURE — 99214 OFFICE O/P EST MOD 30 MIN: CPT | Performed by: PSYCHIATRY & NEUROLOGY

## 2025-05-14 PROCEDURE — G8417 CALC BMI ABV UP PARAM F/U: HCPCS | Performed by: PSYCHIATRY & NEUROLOGY

## 2025-05-14 PROCEDURE — 3074F SYST BP LT 130 MM HG: CPT | Performed by: PSYCHIATRY & NEUROLOGY

## 2025-05-14 PROCEDURE — 1090F PRES/ABSN URINE INCON ASSESS: CPT | Performed by: PSYCHIATRY & NEUROLOGY

## 2025-05-14 PROCEDURE — 1159F MED LIST DOCD IN RCRD: CPT | Performed by: PSYCHIATRY & NEUROLOGY

## 2025-05-14 RX ORDER — CYCLOBENZAPRINE HCL 5 MG
5 TABLET ORAL EVERY 8 HOURS PRN
COMMUNITY
Start: 2025-04-01

## 2025-05-14 NOTE — PROGRESS NOTES
normal bilaterally to finger rub  Cardiovascular -   No clubbing, cyanosis, or edema   Pulmonary-   Good expansion, normal effort without use of accessory muscles  Musculoskeletal -   No significant wasting of muscles noted  Gait as below, see gait exam in the neurologic exam  Muscle strength, tone, stability as below.   No bony deformities  Skin -   Warm, dry, and intact to inspection and palpation.    No rash, erythema, or pallor  Psychiatric -   Mood, affect, and behavior appear normal    Memory as below see mental status examination in the neurologic exam    NEUROLOGICAL EXAM    Mental status   [x]Awake, alert, oriented   [x]Affect attention and concentration appear appropriate  [x]Recent and remote memory appears unremarkable  [x]Speech normal without dysarthria or aphasia, comprehension and repetition intact.   COMMENTS:    Cranial Nerves [x]No VF deficit to confrontation  [x]PERRLA, EOMI, no nystagmus, conjugate eye movements, no ptosis  [x]Face symmetric  [x]Facial sensation intact  [x]Tongue midline no atrophy or fasciculations present  [x]Palate midline, hearing to finger rub normal bilaterally  [x]Shoulder shrug and SCM testing normal bilaterally  COMMENTS:   Motor   [x]5/5 strength x 4 extremities  [x]Normal bulk and tone  []No tremor present  [x]No rigidity or bradykinesia noted  COMMENTS: Mild tremor, questionable bradykinesia, no rigidity.    Sensory  [x]Sensation intact to light touch, pin prick, vibration, and proprioception BLE  [x]Sensation intact to light touch, pin prick, vibration, and proprioception BUE  COMMENTS: Decreased LT, PP, Vib BLE   Coordination [x]FTN normal bilaterally   []HTS normal bilaterally  []CHESTER normal bilaterally.   COMMENTS:   Reflexes  [x]Symmetric and non-pathological  [x]Toes down going bilaterally  [x]No clonus present  COMMENTS:   Gait                  []Normal steady gait    []Ataxic    []Spastic     []Magnetic     []Shuffling  COMMENTS: Cautious, decreased arm swing

## 2025-06-27 ENCOUNTER — TELEPHONE (OUTPATIENT)
Dept: NEUROSURGERY | Age: 75
End: 2025-06-27

## 2025-06-27 NOTE — TELEPHONE ENCOUNTER
Patient called to reschedule appt on 9/15. [facility] was unable to accommodate.  Please return her call.    Thank you!